# Patient Record
Sex: MALE | Race: BLACK OR AFRICAN AMERICAN | Employment: UNEMPLOYED | URBAN - METROPOLITAN AREA
[De-identification: names, ages, dates, MRNs, and addresses within clinical notes are randomized per-mention and may not be internally consistent; named-entity substitution may affect disease eponyms.]

---

## 2018-10-13 ENCOUNTER — HOSPITAL ENCOUNTER (EMERGENCY)
Age: 53
Discharge: HOME | End: 2018-10-13
Attending: EMERGENCY MEDICINE
Payer: COMMERCIAL

## 2018-10-13 ENCOUNTER — APPOINTMENT (OUTPATIENT)
Dept: GENERAL RADIOLOGY | Age: 53
End: 2018-10-13
Payer: COMMERCIAL

## 2018-10-13 VITALS
TEMPERATURE: 96.6 F | DIASTOLIC BLOOD PRESSURE: 72 MMHG | RESPIRATION RATE: 16 BRPM | BODY MASS INDEX: 26.48 KG/M2 | WEIGHT: 185 LBS | OXYGEN SATURATION: 96 % | HEART RATE: 81 BPM | SYSTOLIC BLOOD PRESSURE: 113 MMHG | HEIGHT: 70 IN

## 2018-10-13 DIAGNOSIS — M25.561 RIGHT KNEE PAIN, UNSPECIFIED CHRONICITY: Primary | ICD-10-CM

## 2018-10-13 DIAGNOSIS — K08.89 PAIN, DENTAL: ICD-10-CM

## 2018-10-13 PROCEDURE — 73562 X-RAY EXAM OF KNEE 3: CPT

## 2018-10-13 PROCEDURE — 99283 EMERGENCY DEPT VISIT LOW MDM: CPT

## 2018-10-13 PROCEDURE — 6370000000 HC RX 637 (ALT 250 FOR IP): Performed by: STUDENT IN AN ORGANIZED HEALTH CARE EDUCATION/TRAINING PROGRAM

## 2018-10-13 RX ORDER — PHENYTOIN 50 MG/1
100 TABLET, CHEWABLE ORAL 3 TIMES DAILY
Status: ON HOLD | COMMUNITY
End: 2021-11-25 | Stop reason: HOSPADM

## 2018-10-13 RX ORDER — ACETAMINOPHEN 500 MG
1000 TABLET ORAL ONCE
Status: COMPLETED | OUTPATIENT
Start: 2018-10-13 | End: 2018-10-13

## 2018-10-13 RX ADMIN — ACETAMINOPHEN 1000 MG: 500 TABLET ORAL at 12:48

## 2018-10-13 ASSESSMENT — ENCOUNTER SYMPTOMS
VOMITING: 0
SHORTNESS OF BREATH: 0
ABDOMINAL PAIN: 0
TROUBLE SWALLOWING: 0
COUGH: 0
BACK PAIN: 0
NAUSEA: 0
DIARRHEA: 0
EYE PAIN: 0
RHINORRHEA: 0

## 2018-10-13 ASSESSMENT — PAIN DESCRIPTION - LOCATION: LOCATION: BACK;LEG;MOUTH

## 2018-10-13 ASSESSMENT — PAIN SCALES - GENERAL
PAINLEVEL_OUTOF10: 10
PAINLEVEL_OUTOF10: 10
PAINLEVEL_OUTOF10: 6

## 2018-10-13 ASSESSMENT — PAIN DESCRIPTION - PROGRESSION: CLINICAL_PROGRESSION: NOT CHANGED

## 2018-10-13 ASSESSMENT — PAIN DESCRIPTION - PAIN TYPE: TYPE: ACUTE PAIN

## 2018-10-16 DIAGNOSIS — M25.561 RIGHT KNEE PAIN, UNSPECIFIED CHRONICITY: Primary | ICD-10-CM

## 2021-11-15 ENCOUNTER — APPOINTMENT (OUTPATIENT)
Dept: CT IMAGING | Age: 56
DRG: 817 | End: 2021-11-15
Payer: MEDICARE

## 2021-11-15 ENCOUNTER — HOSPITAL ENCOUNTER (INPATIENT)
Age: 56
LOS: 3 days | Discharge: PSYCHIATRIC OR SUBSTANCE USE FACILITY | DRG: 817 | End: 2021-11-18
Attending: EMERGENCY MEDICINE | Admitting: INTERNAL MEDICINE
Payer: MEDICARE

## 2021-11-15 ENCOUNTER — APPOINTMENT (OUTPATIENT)
Dept: GENERAL RADIOLOGY | Age: 56
DRG: 817 | End: 2021-11-15
Payer: MEDICARE

## 2021-11-15 DIAGNOSIS — R77.8 ELEVATED TROPONIN: ICD-10-CM

## 2021-11-15 DIAGNOSIS — T50.904A DRUG OVERDOSE, UNDETERMINED INTENT, INITIAL ENCOUNTER: Primary | ICD-10-CM

## 2021-11-15 PROBLEM — R73.9 HYPERGLYCEMIA: Status: ACTIVE | Noted: 2021-11-15

## 2021-11-15 PROBLEM — R79.89 ELEVATED LFTS: Status: ACTIVE | Noted: 2021-11-15

## 2021-11-15 PROBLEM — T50.901S DRUG OVERDOSE, ACCIDENTAL OR UNINTENTIONAL, SEQUELA: Status: ACTIVE | Noted: 2021-11-15

## 2021-11-15 PROBLEM — D72.829 LEUKOCYTOSIS: Status: ACTIVE | Noted: 2021-11-15

## 2021-11-15 PROBLEM — R79.89 TROPONIN LEVEL ELEVATED: Status: ACTIVE | Noted: 2021-11-15

## 2021-11-15 PROBLEM — N17.9 AKI (ACUTE KIDNEY INJURY) (HCC): Status: ACTIVE | Noted: 2021-11-15

## 2021-11-15 PROBLEM — Z87.898 HISTORY OF SEIZURES: Status: ACTIVE | Noted: 2021-11-15

## 2021-11-15 LAB
-: NORMAL
ABSOLUTE EOS #: 0.05 K/UL (ref 0–0.44)
ABSOLUTE IMMATURE GRANULOCYTE: 0.07 K/UL (ref 0–0.3)
ABSOLUTE LYMPH #: 3.76 K/UL (ref 1.1–3.7)
ABSOLUTE MONO #: 0.62 K/UL (ref 0.1–1.2)
ACETAMINOPHEN LEVEL: <5 UG/ML (ref 10–30)
ACETAMINOPHEN LEVEL: <5 UG/ML (ref 10–30)
ALBUMIN SERPL-MCNC: 3.5 G/DL (ref 3.5–5.2)
ALBUMIN SERPL-MCNC: 4.9 G/DL (ref 3.5–5.2)
ALBUMIN/GLOBULIN RATIO: 1.3 (ref 1–2.5)
ALBUMIN/GLOBULIN RATIO: 1.4 (ref 1–2.5)
ALLEN TEST: POSITIVE
ALP BLD-CCNC: 110 U/L (ref 40–129)
ALP BLD-CCNC: 72 U/L (ref 40–129)
ALT SERPL-CCNC: 147 U/L (ref 5–41)
ALT SERPL-CCNC: 88 U/L (ref 5–41)
AMORPHOUS: NORMAL
AMPHETAMINE SCREEN URINE: NEGATIVE
ANION GAP SERPL CALCULATED.3IONS-SCNC: 12 MMOL/L (ref 9–17)
ANION GAP SERPL CALCULATED.3IONS-SCNC: 26 MMOL/L (ref 9–17)
AST SERPL-CCNC: 107 U/L
AST SERPL-CCNC: 211 U/L
BACTERIA: NORMAL
BARBITURATE SCREEN URINE: NEGATIVE
BASOPHILS # BLD: 0 % (ref 0–2)
BASOPHILS ABSOLUTE: 0.04 K/UL (ref 0–0.2)
BENZODIAZEPINE SCREEN, URINE: NEGATIVE
BILIRUB SERPL-MCNC: 0.39 MG/DL (ref 0.3–1.2)
BILIRUB SERPL-MCNC: 0.5 MG/DL (ref 0.3–1.2)
BILIRUBIN URINE: NEGATIVE
BNP INTERPRETATION: NORMAL
BUN BLDV-MCNC: 16 MG/DL (ref 6–20)
BUN BLDV-MCNC: 17 MG/DL (ref 6–20)
BUN/CREAT BLD: ABNORMAL (ref 9–20)
BUN/CREAT BLD: ABNORMAL (ref 9–20)
BUPRENORPHINE URINE: ABNORMAL
CALCIUM SERPL-MCNC: 8.2 MG/DL (ref 8.6–10.4)
CALCIUM SERPL-MCNC: 9.7 MG/DL (ref 8.6–10.4)
CANNABINOID SCREEN URINE: NEGATIVE
CASTS UA: NORMAL /LPF (ref 0–8)
CHLORIDE BLD-SCNC: 102 MMOL/L (ref 98–107)
CHLORIDE BLD-SCNC: 97 MMOL/L (ref 98–107)
CO2: 16 MMOL/L (ref 20–31)
CO2: 22 MMOL/L (ref 20–31)
COCAINE METABOLITE, URINE: POSITIVE
COLOR: YELLOW
COMMENT UA: ABNORMAL
CREAT SERPL-MCNC: 0.76 MG/DL (ref 0.7–1.2)
CREAT SERPL-MCNC: 1.31 MG/DL (ref 0.7–1.2)
CRYSTALS, UA: NORMAL /HPF
DIFFERENTIAL TYPE: ABNORMAL
EOSINOPHILS RELATIVE PERCENT: 0 % (ref 1–4)
EPITHELIAL CELLS UA: NORMAL /HPF (ref 0–5)
ESTIMATED AVERAGE GLUCOSE: 108 MG/DL
ETHANOL PERCENT: <0.01 %
ETHANOL: <10 MG/DL
FIO2: ABNORMAL
GFR AFRICAN AMERICAN: >60 ML/MIN
GFR AFRICAN AMERICAN: >60 ML/MIN
GFR NON-AFRICAN AMERICAN: 57 ML/MIN
GFR NON-AFRICAN AMERICAN: >60 ML/MIN
GFR SERPL CREATININE-BSD FRML MDRD: ABNORMAL ML/MIN/{1.73_M2}
GLUCOSE BLD-MCNC: 248 MG/DL (ref 70–99)
GLUCOSE BLD-MCNC: 71 MG/DL (ref 75–110)
GLUCOSE BLD-MCNC: 73 MG/DL (ref 70–99)
GLUCOSE BLD-MCNC: 84 MG/DL (ref 74–100)
GLUCOSE URINE: NEGATIVE
HAV IGM SER IA-ACNC: NONREACTIVE
HBA1C MFR BLD: 5.4 % (ref 4–6)
HCT VFR BLD CALC: 37 % (ref 40.7–50.3)
HCT VFR BLD CALC: 49.5 % (ref 40.7–50.3)
HEMOGLOBIN: 11.5 G/DL (ref 13–17)
HEMOGLOBIN: 14.4 G/DL (ref 13–17)
HEPATITIS B CORE IGM ANTIBODY: NONREACTIVE
HEPATITIS B SURFACE ANTIGEN: NONREACTIVE
HEPATITIS C ANTIBODY: NONREACTIVE
IMMATURE GRANULOCYTES: 1 %
KETONES, URINE: ABNORMAL
LEUKOCYTE ESTERASE, URINE: ABNORMAL
LYMPHOCYTES # BLD: 28 % (ref 24–43)
MAGNESIUM: 2 MG/DL (ref 1.6–2.6)
MCH RBC QN AUTO: 24.7 PG (ref 25.2–33.5)
MCH RBC QN AUTO: 25.2 PG (ref 25.2–33.5)
MCHC RBC AUTO-ENTMCNC: 29.1 G/DL (ref 28.4–34.8)
MCHC RBC AUTO-ENTMCNC: 31.1 G/DL (ref 28.4–34.8)
MCV RBC AUTO: 81 FL (ref 82.6–102.9)
MCV RBC AUTO: 84.8 FL (ref 82.6–102.9)
MDMA URINE: ABNORMAL
METHADONE SCREEN, URINE: NEGATIVE
METHAMPHETAMINE, URINE: ABNORMAL
MODE: ABNORMAL
MONOCYTES # BLD: 5 % (ref 3–12)
MUCUS: NORMAL
MYOGLOBIN: 105 NG/ML (ref 28–72)
NEGATIVE BASE EXCESS, ART: 1 (ref 0–2)
NITRITE, URINE: NEGATIVE
NRBC AUTOMATED: 0 PER 100 WBC
NRBC AUTOMATED: 0 PER 100 WBC
O2 DEVICE/FLOW/%: ABNORMAL
OPIATES, URINE: NEGATIVE
OTHER OBSERVATIONS UA: NORMAL
OXYCODONE SCREEN URINE: NEGATIVE
PARTIAL THROMBOPLASTIN TIME: 22.2 SEC (ref 20.5–30.5)
PARTIAL THROMBOPLASTIN TIME: 33.3 SEC (ref 20.5–30.5)
PARTIAL THROMBOPLASTIN TIME: 38.2 SEC (ref 20.5–30.5)
PATIENT TEMP: ABNORMAL
PDW BLD-RTO: 14.3 % (ref 11.8–14.4)
PDW BLD-RTO: 14.4 % (ref 11.8–14.4)
PH UA: 5 (ref 5–8)
PHENCYCLIDINE, URINE: NEGATIVE
PHENYTOIN DATE LAST DOSE: ABNORMAL
PHENYTOIN DOSE AMOUNT: ABNORMAL
PHENYTOIN DOSE TIME: ABNORMAL
PHENYTOIN LEVEL: <0.8 UG/ML (ref 10–20)
PLATELET # BLD: 217 K/UL (ref 138–453)
PLATELET # BLD: 348 K/UL (ref 138–453)
PLATELET ESTIMATE: ABNORMAL
PMV BLD AUTO: 9.4 FL (ref 8.1–13.5)
PMV BLD AUTO: 9.5 FL (ref 8.1–13.5)
POC HCO3: 26.5 MMOL/L (ref 21–28)
POC O2 SATURATION: 98 % (ref 94–98)
POC PCO2 TEMP: ABNORMAL MM HG
POC PCO2: 56 MM HG (ref 35–48)
POC PH TEMP: ABNORMAL
POC PH: 7.28 (ref 7.35–7.45)
POC PO2 TEMP: ABNORMAL MM HG
POC PO2: 118.1 MM HG (ref 83–108)
POSITIVE BASE EXCESS, ART: ABNORMAL (ref 0–3)
POTASSIUM SERPL-SCNC: 4.3 MMOL/L (ref 3.7–5.3)
POTASSIUM SERPL-SCNC: 5.1 MMOL/L (ref 3.7–5.3)
PRO-BNP: 69 PG/ML
PROCALCITONIN: 0.06 NG/ML
PROPOXYPHENE, URINE: ABNORMAL
PROTEIN UA: ABNORMAL
RBC # BLD: 4.57 M/UL (ref 4.21–5.77)
RBC # BLD: 5.84 M/UL (ref 4.21–5.77)
RBC # BLD: ABNORMAL 10*6/UL
RBC UA: NORMAL /HPF (ref 0–4)
RENAL EPITHELIAL, UA: NORMAL /HPF
SALICYLATE LEVEL: <1 MG/DL (ref 3–10)
SAMPLE SITE: ABNORMAL
SARS-COV-2, RAPID: NOT DETECTED
SEG NEUTROPHILS: 66 % (ref 36–65)
SEGMENTED NEUTROPHILS ABSOLUTE COUNT: 8.86 K/UL (ref 1.5–8.1)
SODIUM BLD-SCNC: 136 MMOL/L (ref 135–144)
SODIUM BLD-SCNC: 139 MMOL/L (ref 135–144)
SPECIFIC GRAVITY UA: 1.02 (ref 1–1.03)
SPECIMEN DESCRIPTION: NORMAL
TCO2 (CALC), ART: ABNORMAL MMOL/L (ref 22–29)
TEST INFORMATION: ABNORMAL
TOTAL CK: 258 U/L (ref 39–308)
TOTAL PROTEIN: 6.2 G/DL (ref 6.4–8.3)
TOTAL PROTEIN: 8.5 G/DL (ref 6.4–8.3)
TOXIC TRICYCLIC SC,BLOOD: NEGATIVE
TRICHOMONAS: NORMAL
TRICYCLIC ANTIDEPRESSANTS, UR: ABNORMAL
TROPONIN INTERP: ABNORMAL
TROPONIN T: ABNORMAL NG/ML
TROPONIN, HIGH SENSITIVITY: 52 NG/L (ref 0–22)
TROPONIN, HIGH SENSITIVITY: 75 NG/L (ref 0–22)
TROPONIN, HIGH SENSITIVITY: 85 NG/L (ref 0–22)
TURBIDITY: CLEAR
URINE HGB: NEGATIVE
UROBILINOGEN, URINE: NORMAL
WBC # BLD: 13.4 K/UL (ref 3.5–11.3)
WBC # BLD: 6.7 K/UL (ref 3.5–11.3)
WBC # BLD: ABNORMAL 10*3/UL
WBC UA: NORMAL /HPF (ref 0–5)
YEAST: NORMAL

## 2021-11-15 PROCEDURE — 82803 BLOOD GASES ANY COMBINATION: CPT

## 2021-11-15 PROCEDURE — 93005 ELECTROCARDIOGRAM TRACING: CPT | Performed by: STUDENT IN AN ORGANIZED HEALTH CARE EDUCATION/TRAINING PROGRAM

## 2021-11-15 PROCEDURE — 85730 THROMBOPLASTIN TIME PARTIAL: CPT

## 2021-11-15 PROCEDURE — 6360000002 HC RX W HCPCS

## 2021-11-15 PROCEDURE — 84145 PROCALCITONIN (PCT): CPT

## 2021-11-15 PROCEDURE — 99223 1ST HOSP IP/OBS HIGH 75: CPT | Performed by: INTERNAL MEDICINE

## 2021-11-15 PROCEDURE — 80074 ACUTE HEPATITIS PANEL: CPT

## 2021-11-15 PROCEDURE — 82947 ASSAY GLUCOSE BLOOD QUANT: CPT

## 2021-11-15 PROCEDURE — 80179 DRUG ASSAY SALICYLATE: CPT

## 2021-11-15 PROCEDURE — 96366 THER/PROPH/DIAG IV INF ADDON: CPT

## 2021-11-15 PROCEDURE — 81001 URINALYSIS AUTO W/SCOPE: CPT

## 2021-11-15 PROCEDURE — 6360000002 HC RX W HCPCS: Performed by: INTERNAL MEDICINE

## 2021-11-15 PROCEDURE — 71045 X-RAY EXAM CHEST 1 VIEW: CPT

## 2021-11-15 PROCEDURE — 85025 COMPLETE CBC W/AUTO DIFF WBC: CPT

## 2021-11-15 PROCEDURE — 85027 COMPLETE CBC AUTOMATED: CPT

## 2021-11-15 PROCEDURE — 83036 HEMOGLOBIN GLYCOSYLATED A1C: CPT

## 2021-11-15 PROCEDURE — 99285 EMERGENCY DEPT VISIT HI MDM: CPT

## 2021-11-15 PROCEDURE — 83880 ASSAY OF NATRIURETIC PEPTIDE: CPT

## 2021-11-15 PROCEDURE — 80185 ASSAY OF PHENYTOIN TOTAL: CPT

## 2021-11-15 PROCEDURE — 80307 DRUG TEST PRSMV CHEM ANLYZR: CPT

## 2021-11-15 PROCEDURE — 80053 COMPREHEN METABOLIC PANEL: CPT

## 2021-11-15 PROCEDURE — 36600 WITHDRAWAL OF ARTERIAL BLOOD: CPT

## 2021-11-15 PROCEDURE — 6370000000 HC RX 637 (ALT 250 FOR IP): Performed by: INTERNAL MEDICINE

## 2021-11-15 PROCEDURE — 2580000003 HC RX 258: Performed by: STUDENT IN AN ORGANIZED HEALTH CARE EDUCATION/TRAINING PROGRAM

## 2021-11-15 PROCEDURE — 70450 CT HEAD/BRAIN W/O DYE: CPT

## 2021-11-15 PROCEDURE — 87635 SARS-COV-2 COVID-19 AMP PRB: CPT

## 2021-11-15 PROCEDURE — 2060000000 HC ICU INTERMEDIATE R&B

## 2021-11-15 PROCEDURE — G0480 DRUG TEST DEF 1-7 CLASSES: HCPCS

## 2021-11-15 PROCEDURE — 80143 DRUG ASSAY ACETAMINOPHEN: CPT

## 2021-11-15 PROCEDURE — 6360000002 HC RX W HCPCS: Performed by: STUDENT IN AN ORGANIZED HEALTH CARE EDUCATION/TRAINING PROGRAM

## 2021-11-15 PROCEDURE — 82550 ASSAY OF CK (CPK): CPT

## 2021-11-15 PROCEDURE — 96365 THER/PROPH/DIAG IV INF INIT: CPT

## 2021-11-15 PROCEDURE — 84484 ASSAY OF TROPONIN QUANT: CPT

## 2021-11-15 PROCEDURE — 83735 ASSAY OF MAGNESIUM: CPT

## 2021-11-15 PROCEDURE — 83874 ASSAY OF MYOGLOBIN: CPT

## 2021-11-15 PROCEDURE — 87086 URINE CULTURE/COLONY COUNT: CPT

## 2021-11-15 RX ORDER — NALOXONE HYDROCHLORIDE 1 MG/ML
INJECTION INTRAMUSCULAR; INTRAVENOUS; SUBCUTANEOUS
Status: COMPLETED
Start: 2021-11-15 | End: 2021-11-15

## 2021-11-15 RX ORDER — NALOXONE HYDROCHLORIDE 0.4 MG/ML
INJECTION, SOLUTION INTRAMUSCULAR; INTRAVENOUS; SUBCUTANEOUS
Status: COMPLETED
Start: 2021-11-15 | End: 2021-11-15

## 2021-11-15 RX ORDER — HEPARIN SODIUM 1000 [USP'U]/ML
2000 INJECTION, SOLUTION INTRAVENOUS; SUBCUTANEOUS PRN
Status: DISCONTINUED | OUTPATIENT
Start: 2021-11-15 | End: 2021-11-17

## 2021-11-15 RX ORDER — SODIUM CHLORIDE 0.9 % (FLUSH) 0.9 %
5-40 SYRINGE (ML) INJECTION EVERY 12 HOURS SCHEDULED
Status: DISCONTINUED | OUTPATIENT
Start: 2021-11-15 | End: 2021-11-18 | Stop reason: HOSPADM

## 2021-11-15 RX ORDER — 0.9 % SODIUM CHLORIDE 0.9 %
500 INTRAVENOUS SOLUTION INTRAVENOUS ONCE
Status: COMPLETED | OUTPATIENT
Start: 2021-11-15 | End: 2021-11-15

## 2021-11-15 RX ORDER — SODIUM CHLORIDE 0.9 % (FLUSH) 0.9 %
5-40 SYRINGE (ML) INJECTION PRN
Status: DISCONTINUED | OUTPATIENT
Start: 2021-11-15 | End: 2021-11-18 | Stop reason: HOSPADM

## 2021-11-15 RX ORDER — 0.9 % SODIUM CHLORIDE 0.9 %
1000 INTRAVENOUS SOLUTION INTRAVENOUS ONCE
Status: COMPLETED | OUTPATIENT
Start: 2021-11-15 | End: 2021-11-15

## 2021-11-15 RX ORDER — FAMOTIDINE 20 MG/1
20 TABLET, FILM COATED ORAL 2 TIMES DAILY
Status: DISCONTINUED | OUTPATIENT
Start: 2021-11-15 | End: 2021-11-18 | Stop reason: HOSPADM

## 2021-11-15 RX ORDER — ONDANSETRON 4 MG/1
4 TABLET, ORALLY DISINTEGRATING ORAL EVERY 8 HOURS PRN
Status: DISCONTINUED | OUTPATIENT
Start: 2021-11-15 | End: 2021-11-18 | Stop reason: HOSPADM

## 2021-11-15 RX ORDER — HEPARIN SODIUM 1000 [USP'U]/ML
4000 INJECTION, SOLUTION INTRAVENOUS; SUBCUTANEOUS ONCE
Status: COMPLETED | OUTPATIENT
Start: 2021-11-15 | End: 2021-11-15

## 2021-11-15 RX ORDER — HEPARIN SODIUM 10000 [USP'U]/100ML
5-30 INJECTION, SOLUTION INTRAVENOUS CONTINUOUS
Status: DISCONTINUED | OUTPATIENT
Start: 2021-11-15 | End: 2021-11-16

## 2021-11-15 RX ORDER — SODIUM CHLORIDE 9 MG/ML
25 INJECTION, SOLUTION INTRAVENOUS PRN
Status: DISCONTINUED | OUTPATIENT
Start: 2021-11-15 | End: 2021-11-18 | Stop reason: HOSPADM

## 2021-11-15 RX ORDER — LORAZEPAM 2 MG/ML
INJECTION INTRAMUSCULAR
Status: COMPLETED
Start: 2021-11-15 | End: 2021-11-15

## 2021-11-15 RX ORDER — DIPHENHYDRAMINE HYDROCHLORIDE 50 MG/ML
INJECTION INTRAMUSCULAR; INTRAVENOUS
Status: COMPLETED
Start: 2021-11-15 | End: 2021-11-15

## 2021-11-15 RX ORDER — PHENYTOIN 50 MG/1
100 TABLET, CHEWABLE ORAL 3 TIMES DAILY
Status: DISCONTINUED | OUTPATIENT
Start: 2021-11-15 | End: 2021-11-18 | Stop reason: HOSPADM

## 2021-11-15 RX ORDER — ONDANSETRON 2 MG/ML
4 INJECTION INTRAMUSCULAR; INTRAVENOUS EVERY 6 HOURS PRN
Status: DISCONTINUED | OUTPATIENT
Start: 2021-11-15 | End: 2021-11-18 | Stop reason: HOSPADM

## 2021-11-15 RX ORDER — POLYETHYLENE GLYCOL 3350 17 G/17G
17 POWDER, FOR SOLUTION ORAL DAILY
Status: DISCONTINUED | OUTPATIENT
Start: 2021-11-15 | End: 2021-11-18 | Stop reason: HOSPADM

## 2021-11-15 RX ORDER — HALOPERIDOL 5 MG/ML
INJECTION INTRAMUSCULAR
Status: COMPLETED
Start: 2021-11-15 | End: 2021-11-15

## 2021-11-15 RX ORDER — HEPARIN SODIUM 1000 [USP'U]/ML
4000 INJECTION, SOLUTION INTRAVENOUS; SUBCUTANEOUS PRN
Status: DISCONTINUED | OUTPATIENT
Start: 2021-11-15 | End: 2021-11-17

## 2021-11-15 RX ADMIN — DIPHENHYDRAMINE HYDROCHLORIDE 50 MG: 50 INJECTION, SOLUTION INTRAMUSCULAR; INTRAVENOUS at 03:31

## 2021-11-15 RX ADMIN — PHENYTOIN SODIUM 1250 MG: 50 INJECTION INTRAMUSCULAR; INTRAVENOUS at 09:15

## 2021-11-15 RX ADMIN — NALOXONE HYDROCHLORIDE 0.4 MG: 0.4 INJECTION, SOLUTION INTRAMUSCULAR; INTRAVENOUS; SUBCUTANEOUS at 05:02

## 2021-11-15 RX ADMIN — HEPARIN SODIUM 4000 UNITS: 1000 INJECTION INTRAVENOUS; SUBCUTANEOUS at 09:45

## 2021-11-15 RX ADMIN — FAMOTIDINE 20 MG: 20 TABLET, FILM COATED ORAL at 23:31

## 2021-11-15 RX ADMIN — HALOPERIDOL LACTATE 5 MG: 5 INJECTION, SOLUTION INTRAMUSCULAR at 03:31

## 2021-11-15 RX ADMIN — HEPARIN SODIUM AND DEXTROSE 15.92 UNITS/KG/HR: 10000; 5 INJECTION INTRAVENOUS at 21:09

## 2021-11-15 RX ADMIN — SODIUM CHLORIDE 1000 ML: 9 INJECTION, SOLUTION INTRAVENOUS at 03:36

## 2021-11-15 RX ADMIN — HEPARIN SODIUM AND DEXTROSE 11.92 UNITS/KG/HR: 10000; 5 INJECTION INTRAVENOUS at 09:48

## 2021-11-15 RX ADMIN — SODIUM CHLORIDE 1000 ML: 9 INJECTION, SOLUTION INTRAVENOUS at 05:18

## 2021-11-15 RX ADMIN — NALOXONE HYDROCHLORIDE 0.4 MG/HR: 1 INJECTION PARENTERAL at 05:53

## 2021-11-15 RX ADMIN — HEPARIN SODIUM 2000 UNITS: 1000 INJECTION INTRAVENOUS; SUBCUTANEOUS at 21:10

## 2021-11-15 RX ADMIN — NALOXONE HYDROCHLORIDE 2 MG: 1 INJECTION PARENTERAL at 05:10

## 2021-11-15 RX ADMIN — HEPARIN SODIUM AND DEXTROSE 13.92 UNITS/KG/HR: 10000; 5 INJECTION INTRAVENOUS at 15:13

## 2021-11-15 RX ADMIN — LORAZEPAM 2 MG: 2 INJECTION INTRAMUSCULAR; INTRAVENOUS at 03:31

## 2021-11-15 RX ADMIN — HEPARIN SODIUM 2000 UNITS: 1000 INJECTION INTRAVENOUS; SUBCUTANEOUS at 15:12

## 2021-11-15 NOTE — CARE COORDINATION
Case Management Initial Discharge Plan  Karine Mckoy,             Met with:patient to discuss discharge plans. Information verified: address, contacts, phone number, , insurance Yes  Insurance Provider: [employer]    Emergency Contact/Next of Kin name & number: per face sheet Consuelo Garcia parent   Who are involved in patient's support system? Family     PCP: No primary care provider on file. Date of last visit: none      Discharge Planning    Living Arrangements:        Home has 2 stories  2 stairs to climb to get into front door, flightstairs to climb to reach second floor  Location of bedroom/bathroom in home up    Patient able to perform ADL's:Independent    Current Services (outpatient & in home)   DME equipment:   DME provider:     Is patient receiving oral anticoagulation therapy? No    If indicated:   Physician managing anticoagulation treatment:   Where does patient obtain lab work for ATC treatment? Potential Assistance Needed:       Patient agreeable to home care: No  Largo of choice provided:  no    Prior SNF/Rehab Placement and Facility:   Agreeable to SNF/Rehab: No  Largo of choice provided: no     Evaluation: no    Expected Discharge date:       Patient expects to be discharged to: If home: is the family and/or caregiver wiling & able to provide support at home? Who will be providing this support? Follow Up Appointment: Best Day/ Time:      Transportation provider: not sure  Transportation arrangements needed for discharge: No    Readmission Risk              Risk of Unplanned Readmission:  9             Does patient have a readmission risk score greater than 14?: No  If yes, follow-up appointment must be made within 7 days of discharge.      Goals of Care:       Educated pt on transitional options, provided freedom of choice and are agreeable with plan      Discharge Plan: follow for needs not sure of plan needs pcp and probably ride - need to

## 2021-11-15 NOTE — H&P
[facility]  Office: [address], DO, Linda Ramirezroe, DO, Ediliamarcellus Dry, DO, Kory Viktoriya Blood, DO, Yoli Manning MD, Roz Yates MD, Ines Rodriguez MD, Ruth Armstrong MD, Griselda Morales MD, Addi Valdez MD, Rosemarie Villegas MD, Dania Herrera, DO, Heather Colon, DO, William Pham MD,  Princess Hedrick, DO, Yuliet Lopez MD, Valeria Worthington MD, Nini Vyas MD, Joseph Lopez MD, Tonio Alcantar MD, Edwin Tolbert MD, Christie Hicks MD, Altagracia Castro [facility], [facility], CNP, [place], CNP, Dinorah Henriquez, CNS, Gurpreet Kapadia, CNP, Sotero Jones, CNP, Seven Mckenzie, CNP, Dipti Dimas, CNP, Alina Vásquez, CNP, Jason Walsh PA-C, Milagros Alcala, [facility], Darius Baptiste, CNP, Phylmicaela Ramon, CNP, Dary Huang, CNP, Cassie Nugent, CNP, Luisa Lipoma, CNP, Stacey Graham, CNP, Veronica Pitts, CNP         [address]    HISTORY AND PHYSICAL EXAMINATION            Date:   11/15/2021  Patient name:  Leila De La Paz  Date of admission:  11/15/2021  3:19 AM  MRN:   [id number]  Account:  [de-identified]  YOB: 1965  PCP:    No primary care provider on file. Room:   01/01  Code Status:    No Order    Chief Complaint:     Chief Complaint   Patient presents with    Drug Overdose     found down, 4mg Narcan given PTA       History Obtained From:     patient, electronic medical record, ER resident    History of Present Illness:     Admitted through ER with following information:  Leila De La Paz is a 50 y.o. male who presents with suspected drug overdose. Patient was found down, unresponsive by EMS and given 4 mg IN narcan en route. Patient awakened immediately and began screaming. Patient extremely combative. Unknown LKW, patient not able to verbalize if he has any pain or if he did any street drugs. Patient screaming and physically combative throughout his initial assessment.  No overt signs of trauma on arrival. Per EMR, has a history of seizures and is supposed to be on dilantin 100 mg TID. Patient was kept on soft restraints which have been removed now  His cardiac enzymes were elevated for which he has been evaluated by cardiology and has been started on heparin drip  His creatinine is slightly elevated1.31, no previous numbers to compare  His LFT are also elevated, no previous numbers to compare  Blood glucose 248, no previous numbers to compare  Currently patient is awake and alert, answering appropriately, states he took some drugs orally, states he has been using IV drugs but is evasive when answering if he took anything this time  Past Medical History:     Past Medical History:   Diagnosis Date    Seizures ([facility] [place] [address]         Past Surgical History:     No past surgical history on file. Medications Prior to Admission:     Prior to Admission medications    Medication Sig Start Date End Date Taking? Authorizing Provider   phenytoin (DILANTIN) 50 MG tablet Take 100 mg by mouth 3 times daily    Historical Provider, MD        Allergies:     Pcn [penicillins]    Social History:     Tobacco:    reports that he has never smoked. He has never used smokeless tobacco.  Alcohol:      reports no history of alcohol use. Drug Use:  reports no history of drug use. Family History:     No family history on file. Review of Systems:   Not a good historian:  Positive and Negative as described in HPI.     CONSTITUTIONAL:  negative for fevers, chills, sweats, fatigue, weight loss  HEENT:  negative for vision, hearing changes, runny nose, throat pain  RESPIRATORY:  negative for shortness of breath, cough, congestion, wheezing  CARDIOVASCULAR:  negative for chest pain, palpitations  GASTROINTESTINAL:  negative for nausea, vomiting, diarrhea, constipation, change in bowel habits, abdominal pain   GENITOURINARY:  negative for difficulty of urination, burning with urination, frequency   INTEGUMENT:  negative for rash, skin lesions, easy bruising HEMATOLOGIC/LYMPHATIC:  negative for swelling/edema   ALLERGIC/IMMUNOLOGIC:  negative for urticaria , itching  ENDOCRINE:  negative increase in drinking, increase in urination, hot or cold intolerance  MUSCULOSKELETAL:  negative joint pains, muscle aches, swelling of joints  NEUROLOGICAL:  negative for headaches, dizziness, lightheadedness, numbness, pain, tingling extremities  BEHAVIOR/PSYCH:  negative for depression, anxiety    Physical Exam:   /77   Pulse 71   Temp 97.5 °F (36.4 °C) (Oral)   Resp 15   Ht 5' 10\" (1.778 m)   Wt 185 lb (83.9 kg)   SpO2 100%   BMI 26.54 kg/m²   Temp (24hrs), Av.4 °F (34.7 °C), Min:88.5 °F (31.4 °C), Max:97.5 °F (36.4 °C)    No results for input(s): POCGLU in the last 72 hours.   No intake or output data in the 24 hours ending 11/15/21 0907    General Appearance: Slightly drowsy, and in no acute distress cooperative for examination  Mental status: oriented to person, place,   Head: normocephalic, atraumatic  Eye: no icterus, redness, pupils slightly constricted, equal and reactive, extraocular eye movements intact, conjunctiva clear  Ear: normal external ear, no discharge, hearing intact  Nose: no drainage noted  Mouth: mucous membranes moist  Neck: supple, no carotid bruits, thyroid not palpable  Lungs: Bilateral equal air entry, clear to ausculation, no wheezing, rales or rhonchi, normal effort  Cardiovascular: normal rate, regular rhythm, no murmur, gallop, rub  Abdomen: Soft, nontender, nondistended, normal bowel sounds, no hepatomegaly or splenomegaly  Neurologic: There are no new focal motor or sensory deficits, normal muscle tone and bulk, no abnormal sensation, normal speech, cranial nerves II through XII grossly intact  Skin: No gross lesions, rashes, bruising or bleeding on exposed skin area  Extremities: peripheral pulses palpable, no pedal edema or calf pain with palpation, no clear needle marks visible  Psych: normal affect    Investigations: Laboratory Testing:  Recent Results (from the past 24 hour(s))   COVID-19, Rapid    Collection Time: 11/15/21  3:41 AM    Specimen: Nasopharyngeal Swab   Result Value Ref Range    Specimen Description . NASOPHARYNGEAL SWAB     SARS-CoV-2, Rapid Not Detected Not Detected   CBC Auto Differential    Collection Time: 11/15/21  3:58 AM   Result Value Ref Range    WBC 13.4 (H) 3.5 - 11.3 k/uL    RBC 5.84 (H) 4.21 - 5.77 m/uL    Hemoglobin 14.4 13.0 - 17.0 g/dL    Hematocrit 49.5 40.7 - 50.3 %    MCV 84.8 82.6 - 102.9 fL    MCH 24.7 (L) 25.2 - 33.5 pg    MCHC 29.1 28.4 - 34.8 g/dL    RDW 14.4 11.8 - 14.4 %    Platelets 583 076 - 508 k/uL    MPV 9.4 8.1 - 13.5 fL    NRBC Automated 0.0 0.0 per 100 WBC    Differential Type NOT REPORTED     Seg Neutrophils 66 (H) 36 - 65 %    Lymphocytes 28 24 - 43 %    Monocytes 5 3 - 12 %    Eosinophils % 0 (L) 1 - 4 %    Basophils 0 0 - 2 %    Immature Granulocytes 1 (H) 0 %    Segs Absolute 8.86 (H) 1.50 - 8.10 k/uL    Absolute Lymph # 3.76 (H) 1.10 - 3.70 k/uL    Absolute Mono # 0.62 0.10 - 1.20 k/uL    Absolute Eos # 0.05 0.00 - 0.44 k/uL    Basophils Absolute 0.04 0.00 - 0.20 k/uL    Absolute Immature Granulocyte 0.07 0.00 - 0.30 k/uL    WBC Morphology NOT REPORTED     RBC Morphology NOT REPORTED     Platelet Estimate NOT REPORTED    Comprehensive Metabolic Panel w/ Reflex to MG    Collection Time: 11/15/21  3:58 AM   Result Value Ref Range    Glucose 248 (H) 70 - 99 mg/dL    BUN 17 6 - 20 mg/dL    CREATININE 1.31 (H) 0.70 - 1.20 mg/dL    Bun/Cre Ratio NOT REPORTED 9 - 20    Calcium 9.7 8.6 - 10.4 mg/dL    Sodium 139 135 - 144 mmol/L    Potassium 5.1 3.7 - 5.3 mmol/L    Chloride 97 (L) 98 - 107 mmol/L    CO2 16 (L) 20 - 31 mmol/L    Anion Gap 26 (H) 9 - 17 mmol/L    Alkaline Phosphatase 110 40 - 129 U/L     (H) 5 - 41 U/L     (H) <40 U/L    Total Bilirubin 0.50 0.3 - 1.2 mg/dL    Total Protein 8.5 (H) 6.4 - 8.3 g/dL    Albumin 4.9 3.5 - 5.2 g/dL    Albumin/Globulin Ratio 1.4 1.0 - 2.5    GFR Non- 57 (L) >60 mL/min    GFR African American >60 >60 mL/min    GFR Comment          GFR Staging NOT REPORTED    Troponin    Collection Time: 11/15/21  3:58 AM   Result Value Ref Range    Troponin, High Sensitivity 52 (HH) 0 - 22 ng/L    Troponin T NOT REPORTED <0.03 ng/mL    Troponin Interp NOT REPORTED    Brain Natriuretic Peptide    Collection Time: 11/15/21  3:58 AM   Result Value Ref Range    Pro-BNP 69 <300 pg/mL    BNP Interpretation NOT REPORTED    Procalcitonin    Collection Time: 11/15/21  3:58 AM   Result Value Ref Range    Procalcitonin 0.06 <0.09 ng/mL   CK    Collection Time: 11/15/21  3:58 AM   Result Value Ref Range    Total  39 - 308 U/L   MYOGLOBIN, SERUM    Collection Time: 11/15/21  3:58 AM   Result Value Ref Range    Myoglobin 105 (H) 28 - 72 ng/mL   PHENYTOIN LEVEL, TOTAL    Collection Time: 11/15/21  3:58 AM   Result Value Ref Range    Phenytoin Lvl <0.8 (L) 10.0 - 20.0 ug/mL    Phenytoin Dose Amount NOT REPORTED     Phenytoin Date Last Dose NOT REPORTED     Phenytoin Dose Time NOT REPORTED    Hepatitis Panel, Acute    Collection Time: 11/15/21  3:58 AM   Result Value Ref Range    Hepatitis B Surface Ag NONREACTIVE NONREACTIVE    Hepatitis C Ab NONREACTIVE NONREACTIVE    Hep B Core Ab, IgM NONREACTIVE NONREACTIVE    Hep A IgM NONREACTIVE NONREACTIVE   TOX SCR, BLD, ED    Collection Time: 11/15/21  5:23 AM   Result Value Ref Range    Acetaminophen Level <5 (L) 10 - 30 ug/mL    Ethanol <10 <10 mg/dL    Ethanol percent <9.327 <4.502 %    Salicylate Lvl <1 (L) 3 - 10 mg/dL    Toxic Tricyclic Sc,Blood NEGATIVE NEGATIVE   Troponin    Collection Time: 11/15/21  5:23 AM   Result Value Ref Range    Troponin, High Sensitivity 75 (HH) 0 - 22 ng/L    Troponin T NOT REPORTED <0.03 ng/mL    Troponin Interp NOT REPORTED    Troponin    Collection Time: 11/15/21  7:01 AM   Result Value Ref Range    Troponin, High Sensitivity 85 (HH) 0 - 22 ng/L    Troponin T NOT REPORTED <0.03 ng/mL    Troponin Interp NOT REPORTED    APTT    Collection Time: 11/15/21  8:11 AM   Result Value Ref Range    PTT 22.2 20.5 - 30.5 sec       Imaging/Diagnostics:  CT Head WO Contrast    Result Date: 11/15/2021  No acute intracranial abnormality. XR CHEST PORTABLE    Result Date: 11/15/2021  Findings suggestive of mild congestive failure. Otherwise negative exam. Pneumothorax is not excluded on this supine image. Assessment :      Hospital Problems           Last Modified POA    * (Principal) Drug overdose, accidental or unintentional, sequela 11/15/2021 Yes    History of seizurestakes Dilantin 11/15/2021 Yes    Troponin level elevated 11/15/2021 Yes    Elevated LFTs 11/15/2021 Yes    SYLVIA (acute kidney injury) ([address] 11/15/2021 Yes    Hyperglycemia 11/15/2021 Yes    Leukocytosis 11/15/2021 Yes          Plan:     Patient status inpatient in the Progressive Unit/Step down    1. Narcan drip is being discontinued  2. Continue heparin drip and follow cardiology evaluations and plan  3. Continue heparin drip for now, will change to oral when able to take orally  4. Check for ABG, urine drug screen, hepatitis panel and A1c  5. Start oral diet if he is able to take orally now  6. Seizure precautions  7. Monitor labs in morning    Consultations:   IP CONSULT TO CARDIOLOGY  IP CONSULT TO HOSPITALIST  IP CONSULT TO PSYCHIATRY     Patient is admitted as inpatient status because of co-morbidities listed above, severity of signs and symptoms as outlined, requirement for current medical therapies and most importantly because of direct risk to patient if care not provided in a hospital setting. Expected length of stay > 48 hours. Nicolette Rogers MD  11/15/2021  9:07 AM    Copy sent to Dr. Zuleyma Burr primary care provider on file.

## 2021-11-15 NOTE — ED PROVIDER NOTES
[facility]  ED  Emergency Department Encounter  EmergencyMedicine Resident     Pt Name:Carlos Renteria  MRN: [id number]  [place] 1965  Date of evaluation: 11/15/21  PCP:  No primary care provider on file. This patient was evaluated in the Emergency Department for symptoms described in the history of present illness. The patient was evaluated in the context of the global COVID-19 pandemic, which necessitated consideration that the patient might be at risk for infection with the SARS-CoV-2 virus that causes COVID-19. Institutional protocols and algorithms that pertain to the evaluation of patients at risk for COVID-19 are in a state of rapid change based on information released by regulatory bodies including the CDC and federal and state organizations. These policies and algorithms were followed during the patient's care in the ED. CHIEF COMPLAINT       Chief Complaint   Patient presents with    Drug Overdose     found down, 4mg Narcan given PTA       HISTORY OF PRESENT ILLNESS  (Location/Symptom, Timing/Onset, Context/Setting, Quality, Duration, Modifying Factors, Severity.)      Ioana Jauregui is a 50 y.o. male who presents with suspected drug overdose. Patient was found down, unresponsive by EMS and given 4 mg IN narcan en route. Patient awakened immediately and began screaming. Patient extremely combative. Unknown LKW, patient not able to verbalize if he has any pain or if he did any street drugs. Patient screaming and physically combative throughout his initial assessment. No overt signs of trauma on arrival. Per EMR, has a history of seizures and is supposed to be on dilantin 100 mg TID. PAST MEDICAL / SURGICAL / SOCIAL / FAMILY HISTORY      has a past medical history of Seizures ([facility] [address] has no past surgical history on file.     Social History     Socioeconomic History    Marital status: Single     Spouse name: Not on file    Number of children: Not on file    Years of education: Not on file    Highest education level: Not on file   Occupational History    Not on file   Tobacco Use    Smoking status: Never Smoker    Smokeless tobacco: Never Used   Substance and Sexual Activity    Alcohol use: No    Drug use: No    Sexual activity: Not on file   Other Topics Concern    Not on file   Social History Narrative    Not on file     Social Determinants of Health     Financial Resource Strain:     Difficulty of Paying Living Expenses: Not on file   Food Insecurity:     Worried About Running Out of Food in the Last Year: Not on file    Ervin of Food in the Last Year: Not on file   Transportation Needs:     Lack of Transportation (Medical): Not on file    Lack of Transportation (Non-Medical): Not on file   Physical Activity:     Days of Exercise per Week: Not on file    Minutes of Exercise per Session: Not on file   Stress:     Feeling of Stress : Not on file   Social Connections:     Frequency of Communication with Friends and Family: Not on file    Frequency of Social Gatherings with Friends and Family: Not on file    Attends [employer] Services: Not on file    Active Member of [address] [place] [address] or Organizations: Not on file    Attends Club or Organization Meetings: Not on file    Marital Status: Not on file   Intimate Partner Violence:     Fear of Current or Ex-Partner: Not on file    Emotionally Abused: Not on file    Physically Abused: Not on file    Sexually Abused: Not on file   Housing Stability:     Unable to Pay for Housing in the Last Year: Not on file    Number of [place] in the Last Year: Not on file    Unstable Housing in the Last Year: Not on file       No family history on file. Allergies:  Pcn [penicillins]    Home Medications:  Prior to Admission medications    Medication Sig Start Date End Date Taking?  Authorizing Provider   phenytoin (DILANTIN) 50 MG tablet Take 100 mg by mouth 3 times daily    Historical Provider, MD       REVIEW OF SYSTEMS (2-9 systems for level 4, 10 or more for level 5)      Review of Systems   Unable to perform ROS: Mental status change       PHYSICAL EXAM   (up to 7 for level 4, 8 or more for level 5)      INITIAL VITALS:   /86   Pulse 83   Temp 97.5 °F (36.4 °C) (Oral)   Resp 13   Ht 5' 10\" (1.778 m)   Wt 185 lb (83.9 kg)   SpO2 100%   BMI 26.54 kg/m²     Physical Exam  Vitals reviewed: Initial physical exam limited due to patient extreme combativeness and aggression. HENT:      Head: Normocephalic and atraumatic. Eyes:      Comments: Pinpoint pupils   Cardiovascular:      Rate and Rhythm: Normal rate and regular rhythm. Pulmonary:      Effort: Pulmonary effort is normal.      Breath sounds: Normal breath sounds. Abdominal:      Palpations: Abdomen is soft. Tenderness: There is no abdominal tenderness. Musculoskeletal:      Cervical back: Neck supple. Skin:     Capillary Refill: Capillary refill takes less than 2 seconds. Neurological:      Mental Status: He is disoriented.        DIFFERENTIAL  DIAGNOSIS     PLAN (LABS / IMAGING / EKG):  Orders Placed This Encounter   Procedures    COVID-19, Rapid    XR CHEST PORTABLE    CT Head WO Contrast    CBC Auto Differential    Comprehensive Metabolic Panel w/ Reflex to MG    Troponin    Brain Natriuretic Peptide    Procalcitonin    CK    MYOGLOBIN, SERUM    PHENYTOIN LEVEL, TOTAL    TOX SCR, BLD, ED    Urinalysis Reflex to Culture    Troponin    Troponin    APTT    Inpatient consult to Cardiology    Inpatient consult to Hospitalist    EKG 12 Lead    Restraints violent or self-destructive adult (older than 16yo)       MEDICATIONS ORDERED:  Orders Placed This Encounter   Medications    LORazepam (ATIVAN) 2 MG/ML injection     Mariel Bradshaw: cabinet override    diphenhydrAMINE (BENADRYL) 50 MG/ML injection     Mariel Bradshaw: cabinet override    haloperidol lactate (HALDOL) 5 MG/ML injection     Mariel Bradshaw: cabinet override    0.9 % sodium chloride bolus    naloxone (NARCAN) 0.4 MG/ML injection     VLADIMIR BARROS: cabinet override    naloxone (NARCAN) 2 MG/2ML injection     Sosa Monge: cabinet override    0.9 % sodium chloride bolus    naloxone (NARCAN) 2 mg in sodium chloride 0.9 % 500 mL infusion    phenytoin (DILANTIN) 1,250 mg in sodium chloride 0.9 % 100 mL IVPB (loading dose)    heparin (porcine) injection 4,000 Units    heparin (porcine) injection 4,000 Units    heparin (porcine) injection 2,520 Units    heparin 25,000 units in dextrose 5% 250 mL (premix) infusion       DDX: Drug overdose vs ACS vs MI vs CVA vs pneumothorax vs pleural effusion    DIAGNOSTIC RESULTS / EMERGENCY DEPARTMENT COURSE / MDM   LAB RESULTS:  Results for orders placed or performed during the hospital encounter of 11/15/21   COVID-19, Rapid    Specimen: Nasopharyngeal Swab   Result Value Ref Range    Specimen Description . NASOPHARYNGEAL SWAB     SARS-CoV-2, Rapid Not Detected Not Detected   CBC Auto Differential   Result Value Ref Range    WBC 13.4 (H) 3.5 - 11.3 k/uL    RBC 5.84 (H) 4.21 - 5.77 m/uL    Hemoglobin 14.4 13.0 - 17.0 g/dL    Hematocrit 49.5 40.7 - 50.3 %    MCV 84.8 82.6 - 102.9 fL    MCH 24.7 (L) 25.2 - 33.5 pg    MCHC 29.1 28.4 - 34.8 g/dL    RDW 14.4 11.8 - 14.4 %    Platelets 618 865 - 496 k/uL    MPV 9.4 8.1 - 13.5 fL    NRBC Automated 0.0 0.0 per 100 WBC    Differential Type NOT REPORTED     Seg Neutrophils 66 (H) 36 - 65 %    Lymphocytes 28 24 - 43 %    Monocytes 5 3 - 12 %    Eosinophils % 0 (L) 1 - 4 %    Basophils 0 0 - 2 %    Immature Granulocytes 1 (H) 0 %    Segs Absolute 8.86 (H) 1.50 - 8.10 k/uL    Absolute Lymph # 3.76 (H) 1.10 - 3.70 k/uL    Absolute Mono # 0.62 0.10 - 1.20 k/uL    Absolute Eos # 0.05 0.00 - 0.44 k/uL    Basophils Absolute 0.04 0.00 - 0.20 k/uL    Absolute Immature Granulocyte 0.07 0.00 - 0.30 k/uL    WBC Morphology NOT REPORTED     RBC Morphology NOT REPORTED     Platelet Estimate NOT REPORTED Comprehensive Metabolic Panel w/ Reflex to MG   Result Value Ref Range    Glucose 248 (H) 70 - 99 mg/dL    BUN 17 6 - 20 mg/dL    CREATININE 1.31 (H) 0.70 - 1.20 mg/dL    Bun/Cre Ratio NOT REPORTED 9 - 20    Calcium 9.7 8.6 - 10.4 mg/dL    Sodium 139 135 - 144 mmol/L    Potassium 5.1 3.7 - 5.3 mmol/L    Chloride 97 (L) 98 - 107 mmol/L    CO2 16 (L) 20 - 31 mmol/L    Anion Gap 26 (H) 9 - 17 mmol/L    Alkaline Phosphatase 110 40 - 129 U/L     (H) 5 - 41 U/L     (H) <40 U/L    Total Bilirubin 0.50 0.3 - 1.2 mg/dL    Total Protein 8.5 (H) 6.4 - 8.3 g/dL    Albumin 4.9 3.5 - 5.2 g/dL    Albumin/Globulin Ratio 1.4 1.0 - 2.5    GFR Non- 57 (L) >60 mL/min    GFR African American >60 >60 mL/min    GFR Comment          GFR Staging NOT REPORTED    Troponin   Result Value Ref Range    Troponin, High Sensitivity 52 (HH) 0 - 22 ng/L    Troponin T NOT REPORTED <0.03 ng/mL    Troponin Interp NOT REPORTED    Brain Natriuretic Peptide   Result Value Ref Range    Pro-BNP 69 <300 pg/mL    BNP Interpretation NOT REPORTED    Procalcitonin   Result Value Ref Range    Procalcitonin 0.06 <0.09 ng/mL   CK   Result Value Ref Range    Total  39 - 308 U/L   MYOGLOBIN, SERUM   Result Value Ref Range    Myoglobin 105 (H) 28 - 72 ng/mL   PHENYTOIN LEVEL, TOTAL   Result Value Ref Range    Phenytoin Lvl <0.8 (L) 10.0 - 20.0 ug/mL    Phenytoin Dose Amount NOT REPORTED     Phenytoin Date Last Dose NOT REPORTED     Phenytoin Dose Time NOT REPORTED    TOX SCR, BLD, ED   Result Value Ref Range    Acetaminophen Level <5 (L) 10 - 30 ug/mL    Ethanol <10 <10 mg/dL    Ethanol percent <3.867 <1.318 %    Salicylate Lvl <1 (L) 3 - 10 mg/dL    Toxic Tricyclic Sc,Blood NEGATIVE NEGATIVE   Troponin   Result Value Ref Range    Troponin, High Sensitivity 75 (HH) 0 - 22 ng/L    Troponin T NOT REPORTED <0.03 ng/mL    Troponin Interp NOT REPORTED    Troponin   Result Value Ref Range    Troponin, High Sensitivity 85 (HH) 0 - 22 ng/L Troponin T NOT REPORTED <0.03 ng/mL    Troponin Interp NOT REPORTED        IMPRESSION: Leukocytosis. Troponin 53 > 75 > 85. Dilantin level 0. Elevated myoglobin. SYLVIA. RADIOLOGY:  CT Head WO Contrast    Result Date: 11/15/2021  EXAMINATION: CT OF THE HEAD WITHOUT CONTRAST  11/15/2021 4:13 am TECHNIQUE: CT of the head was performed without the administration of intravenous contrast. Dose modulation, iterative reconstruction, and/or weight based adjustment of the mA/kV was utilized to reduce the radiation dose to as low as reasonably achievable. COMPARISON: 11/20/2006. HISTORY: ORDERING SYSTEM PROVIDED HISTORY: overdose, unknown if fall TECHNOLOGIST PROVIDED HISTORY: overdose, unknown if fall Decision Support Exception - unselect if not a suspected or confirmed emergency medical condition->Emergency Medical Condition (MA) Reason for Exam: drug overdose FINDINGS: BRAIN/VENTRICLES: There is no acute intracranial hemorrhage, mass effect or midline shift. No abnormal extra-axial fluid collection. There is no acute infarct. 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 The appearance is unchanged from the 2006 exam.  There is no evidence of hydrocephalus. ORBITS: The visualized portion of the orbits demonstrate no acute abnormality. SINUSES: The visualized paranasal sinuses and mastoid air cells demonstrate no acute abnormality. SOFT TISSUES/SKULL:  No acute abnormality of the visualized skull or soft tissues. No acute intracranial abnormality.      XR CHEST PORTABLE    Result Date: 11/15/2021  EXAMINATION: ONE XRAY VIEW OF THE CHEST 11/15/2021 4:35 am COMPARISON: 07/07/2006 HISTORY: ORDERING SYSTEM PROVIDED HISTORY: overdose, unknown if fall, r/o fracture, r/o pneumo TECHNOLOGIST PROVIDED HISTORY: overdose, unknown if fall, r/o fracture, r/o pneumo Reason for Exam: supine port, Overdose FINDINGS: Mild cardiomegaly increased from previous. Pulmonary vasculature appears mildly congested. The lungs are otherwise clear low lung volumes. Pneumothorax and pleural effusion are not excluded on this supine image. Surrounding structures are unremarkable. Findings suggestive of mild congestive failure. Otherwise negative exam. Pneumothorax is not excluded on this supine image. EKG  EKG: normal sinus rhythm. All EKG's are interpreted by the Emergency Department Physician who either signs or Co-signs this chart in the absence of a cardiologist.    EMERGENCY DEPARTMENT COURSE:  ED Course as of 11/15/21 0746   Mon Nov 15, 2021   0455 Troponin, High Sensitivity(!!): 52 [JG]   0456 WBC(!): 13.4 [JG]   0506 Patient respirations decreased to 6/min, patient was given 0.4 mg narcan IV with subsequent increase in respirations to 12/min [JG]   0530 Patient respirations back down to 6/min, patient started on narcan drip [JG]   0614 Troponin, High Sensitivity(!!): 75 [JG]   [id number] Cardiology consulted for increasing troponins. Cardiology recommended getting a third troponin and if still uptrending, start on heparin drip [JG]   0713 Temperature increased to 97.8, measured orally. Patient able to answer basic questions, is unable to tell us what he took or what happened prior to arrival. Patient to be admitted for overdose on narcan drip, with uptrending troponins 53 > 75, leukocytosis, and restarting dilantin. Patient given loading dose of dilantin [JG]   0744 Troponin, High Sensitivity(!!): 85 [JG]   0745 3rd troponin uptrending at 85. Per cardiology recommendation, patient started on heparin drip. [JG]      ED Course User Index  [JG] Kayla Osei MD     CONSULTS:  IP CONSULT TO CARDIOLOGY  IP CONSULT TO HOSPITALIST    FINAL IMPRESSION      1. Drug overdose, undetermined intent, initial encounter    2.  Elevated troponin          DISPOSITION / PLAN     DISPOSITION  Admitted 11/15/2021 8:15 AM    PATIENT REFERRED TO:  No follow-up provider specified.     DISCHARGE MEDICATIONS:  New Prescriptions    No medications on file       Dee Lowe MD  Emergency Medicine Resident    (Please note that portions of thisnote were completed with a voice recognition program.  Efforts were made to edit the dictations but occasionally words are mis-transcribed.)       Dee Lowe MD  Resident  11/15/21 9429

## 2021-11-15 NOTE — ED PROVIDER NOTES
FACULTY SIGN-OUT  ADDENDUM       Patient: Michael Fernandez   MRN: [id number]  PCP:  No primary care provider on file. Attestation  I was available and discussed any additional care issues that arose and coordinated the management plans with the resident(s) caring for the patient during my duty period. Any areas of disagreement with resident's documentation of care or procedures are noted on the chart. I was personally present for the key portions of any/all procedures during my duty period. I have documented in the chart those procedures where I was not present during the key portions. The patient's initial evaluation and plan have been discussed with the prior provider who initially evaluated the patient. Pertinent Comments:   The patient is a 50 y.o. male taken in signout with narcotic/opiate overdose requiring Narcan then becoming belligerent requiring B-52 immediately upon arrival.    Patient then having some respiratory issues requiring more Narcan eventual Narcan drip doing much better now  We are awaiting admission    ED COURSE      The patient was given the following medications:  Orders Placed This Encounter   Medications    LORazepam (ATIVAN) 2 MG/ML injection     Mariel Bradshaw: cabinet override    diphenhydrAMINE (BENADRYL) 50 MG/ML injection     Mariel Bradshaw: cabinet override    haloperidol lactate (HALDOL) 5 MG/ML injection     Mariel Bradshaw: cabinet override    0.9 % sodium chloride bolus    naloxone (NARCAN) 0.4 MG/ML injection     VLADIMIR BARROS: cabinet override    naloxone (NARCAN) 2 MG/2ML injection     Sosa Teran: cabinet override    0.9 % sodium chloride bolus    naloxone (NARCAN) 2 mg in sodium chloride 0.9 % 500 mL infusion    phenytoin (DILANTIN) 1,260 mg in sodium chloride 0.9 % 100 mL IVPB (loading dose)       RECENT VITALS:   BP: 115/69  Pulse: 83  Resp: 10  Temp: 97.3 °F (36.3 °C) SpO2: 99 %    (Please note that portions of this note were completed with a voice recognition program.  Efforts were made to edit the dictations but occasionally words are mis-transcribed.)    MD Caity lA  Attending Emergency Medicine Physician       Israel Vargas MD  11/15/21 7479

## 2021-11-15 NOTE — ED NOTES
Dr. Butch Whitehead at the bedside     Yamilex Cavazos, [address] [place] [address]  11/15/21 5364

## 2021-11-15 NOTE — ED NOTES
External urine collection bag placed and connected to wall suction.      Lucía Rae RN  11/15/21 2554

## 2021-11-15 NOTE — Clinical Note
Patient Class: Inpatient [101]   REQUIRED: Diagnosis: Drug overdose, accidental or unintentional, sequela [[id number]]   Estimated Length of Stay: Estimated stay of less than 2 midnights   Telemetry/Cardiac Monitoring Required?: No

## 2021-11-15 NOTE — CONSULTS
Attending Physician Statement:    I have discussed the care of  Fior Gilbert , including pertinent history and exam findings, with the Cardiology fellow/resident. I have seen and examined the patient and the key elements of all parts of the encounter have been performed by me. I agree with the assessment, plan and orders as documented by the fellow/resident, after I modified exam findings and plan of treatments, and the final version is my approved version of the assessment. Additional Comments:   Known history of seizure opiate overdose on Narcan drip  Elevated tropes possible type II  We will get echocardiogram if it is high risk further ischemia work-up be depending on it  Dr. Maria E Choi Cardiology Cardiology    Consult / H&P               Today's Date: 11/15/2021  Patient Name: Fior Gilbert  Date of admission: 11/15/2021  3:19 AM  Patient's age: 50 y. o., 1965  Admission Dx: No admission diagnoses are documented for this encounter. Reason for Consult: elev trop     Requesting Physician: No admitting provider for patient encounter. CHIEF COMPLAINT: AMS    History Obtained From:  patient, EMR    HISTORY OF PRESENT ILLNESS:      The patient is a 50 y.o. male who presents to the hospital with AMS, was found to have opiate overdose requiring Narcan drip. On arrival, pt's BP was high 179/123, hypothermic @88.5F, now WNL. Pertinent labs done Cr 1.31, trop 52>75(no baseline in EMR), proBNP WNL, K 5.1. When I went to evaluate the patient, he was denying any chest pain, no B/L swelling noted, was on 5L NC, on Narcan drip. EKG shows NSR. No cardiac history on file. Past Medical History:   has a past medical history of Seizures ([facility] [address] Past Surgical History:   has no past surgical history on file. Home Medications:    Prior to Admission medications    Medication Sig Start Date End Date Taking?  Authorizing Provider   phenytoin (DILANTIN) 50 MG tablet Take 100 mg by mouth 3 times daily    Historical Provider, MD       Allergies:  Pcn [penicillins]    Social History:   reports that he has never smoked. He has never used smokeless tobacco. He reports that he does not drink alcohol and does not use drugs. Family History: family history is not on file. No h/o sudden cardiac death. REVIEW OF SYSTEMS:    · Constitutional: there has been no unanticipated weight loss. There's been No change in energy level, No change in activity level. · Eyes: No visual changes or diplopia. No scleral icterus. · ENT: No Headaches  · Cardiovascular: as above  · Respiratory: No previous pulmonary problems, No cough  · Gastrointestinal: No abdominal pain. No change in bowel or bladder habits. · Genitourinary: No dysuria, trouble voiding, or hematuria. · Musculoskeletal:  No gait disturbance, No weakness or joint complaints. · Integumentary: No rash or pruritis. · Neurological: No headache, diplopia, change in muscle strength, numbness or tingling. No change in gait, balance, coordination, mood, affect, memory, mentation, behavior. · Psychiatric: No anxiety, or depression. · Endocrine: No temperature intolerance. No excessive thirst, fluid intake, or urination. No tremor. · Hematologic/Lymphatic: No abnormal bruising or bleeding, blood clots or swollen lymph nodes. · Allergic/Immunologic: No nasal congestion or hives. PHYSICAL EXAM:      /69   Pulse 83   Temp 97.3 °F (36.3 °C) (Oral)   Resp 10   Ht 5' 10\" (1.778 m)   Wt 185 lb (83.9 kg)   SpO2 99%   BMI 26.54 kg/m²    Constitutional and General Appearance: alert, cooperative, no distress and appears stated age  HEENT: PERRL, no cervical lymphadenopathy. No masses palpable. Normal oral mucosa  Respiratory:  · Normal excursion and expansion without use of accessory muscles  · Resp Auscultation: Good respiratory effort. No for increased work of breathing.  On auscultation: clear to auscultation bilaterally  Cardiovascular:  · The apical impulse is not displaced  · Heart tones are crisp and normal. regular S1 and S2.  · Jugular venous pulsation Normal  · The carotid upstroke is normal in amplitude and contour without delay or bruit  · Peripheral pulses are symmetrical and full   Abdomen:   · No masses or tenderness  · Bowel sounds present  Extremities:  ·  No Cyanosis or Clubbing  ·  Lower extremity edema: No  ·  Skin: Warm and dry  Neurological:  · Alert and oriented. · Moves all extremities well  · No abnormalities of mood, affect, memory, mentation, or behavior are noted      Labs:     CBC:   Recent Labs     11/15/21  0358   WBC 13.4*   HGB 14.4   HCT 49.5        BMP:   Recent Labs     11/15/21  0358      K 5.1   CO2 16*   BUN 17   CREATININE 1.31*   LABGLOM 57*   GLUCOSE 248*     BNP: No results for input(s): BNP in the last 72 hours. PT/INR: No results for input(s): PROTIME, INR in the last 72 hours. APTT:No results for input(s): APTT in the last 72 hours. CARDIAC ENZYMES:  Recent Labs     11/15/21  0358   CKTOTAL 258     FASTING LIPID PANEL:No results found for: HDL, LDLDIRECT, LDLCALC, TRIG  LIVER PROFILE:  Recent Labs     11/15/21  0358   *   *   LABALBU 4.9         There is no problem list on file for this patient. DATA:    IMPRESSION:    1. Elevated troponins  2. Opioid overdose    RECOMMENDATIONS:  1. Start heparin drip x48 hrs if there are no further contraindications. 2. F/u on EKG   3. Keep K>4, Mg>2  4. Rest of the mgt as per primary team.     Discussed with Dr Teresa Guillory, attending.      Maryellen Odonnell MD  Cardiology Service  Internal Medicine Residency Program, PGY-1  [facility]

## 2021-11-15 NOTE — ED NOTES
Pt resting in bed with eyes closed. NAD noted. Will continue to monitor.       Renato Griffin RN  11/15/21 9703

## 2021-11-15 NOTE — ED NOTES
Pt resting in bed with eyes closed. Pt remains on bairhugger. Lights dimmed. NAD noted.      Raynelle Sacks, RN  11/15/21 4014

## 2021-11-15 NOTE — ED PROVIDER NOTES
[facility]     Emergency Department     Faculty Attestation        I performed a history and physical examination of the patient and discussed management with the resident. I reviewed the residents note and agree with the documented findings and plan of care. Any areas of disagreement are noted on the chart. I was personally present for the key portions of any procedures. I have documented in the chart those procedures where I was not present during the key portions. I have reviewed the emergency nurses triage note. I agree with the chief complaint, past medical history, past surgical history, allergies, medications, social and family history as documented unless otherwise noted below. For mid-level providers such as nurse practitioners as well as physicians assistants:    I have personally seen and evaluated the patient. I find the patient's history and physical exam are consistent with NP/PA documentation. I agree with the care provided, treatment rendered, disposition, & follow-up plan. Additional findings are as noted. Vital Signs: BP (!) 179/123   Pulse 95   Temp (!) 88.5 °F (31.4 °C) (Rectal)   Resp 16   Ht 5' 10\" (1.778 m)   Wt 185 lb (83.9 kg)   BMI 26.54 kg/m²   PCP:  No primary care provider on file. Pertinent Comments:     Patient presents to the emergency department for evaluation of altered mentation. He is reportedly using drugs tonight. He was found on the ground unresponsive unknown last known well. He was combative and thrashing and he required chemical sedation shortly after he came to the emergency department. He is hypothermic here but he has no signs to suggest trauma.   Obtain broad laboratory work-up including CK myoglobin, EKG, chest x-ray CT imaging of brain, reassessment, probable admission      Critical Care  None          Lashon Terrazas MD    Attending Emergency Medicine Physician              Margaret Alexander MD Rudy  11/15/21 6945

## 2021-11-15 NOTE — ED NOTES
Bed: 01  Expected date:   Expected time:   Means of arrival:   Comments:  [address], RN  11/15/21 0532

## 2021-11-15 NOTE — ED PROVIDER NOTES
[facility]  Emergency Department  Emergency Medicine Resident Sign-out     Care of Chuy Pugh was assumed from Dr. Jackie Medellin and is being seen for Drug Overdose (found down, 4mg Narcan given PTA)  . The patient's initial evaluation and plan have been discussed with the prior provider who initially evaluated the patient.      EMERGENCY DEPARTMENT COURSE / MEDICAL DECISION MAKING:       MEDICATIONS GIVEN:  Orders Placed This Encounter   Medications    LORazepam (ATIVAN) 2 MG/ML injection     Mariel Bradshaw: cabinet override    diphenhydrAMINE (BENADRYL) 50 MG/ML injection     Mariel Bradshaw: cabinet override    haloperidol lactate (HALDOL) 5 MG/ML injection     Mariel Bradshaw: cabinet override    0.9 % sodium chloride bolus    naloxone (NARCAN) 0.4 MG/ML injection     VLADIMIR BARROS: cabinet override    naloxone (NARCAN) 2 MG/2ML injection     Sosa Monge: cabinet override    0.9 % sodium chloride bolus    naloxone (NARCAN) 2 mg in sodium chloride 0.9 % 500 mL infusion    phenytoin (DILANTIN) 1,250 mg in sodium chloride 0.9 % 100 mL IVPB (loading dose)    heparin (porcine) injection 4,000 Units    heparin (porcine) injection 4,000 Units    heparin (porcine) injection 2,000 Units    heparin 25,000 units in dextrose 5% 250 mL (premix) infusion       LABS / RADIOLOGY:     Labs Reviewed   CBC WITH AUTO DIFFERENTIAL - Abnormal; Notable for the following components:       Result Value    WBC 13.4 (*)     RBC 5.84 (*)     MCH 24.7 (*)     Seg Neutrophils 66 (*)     Eosinophils % 0 (*)     Immature Granulocytes 1 (*)     Segs Absolute 8.86 (*)     Absolute Lymph # 3.76 (*)     All other components within normal limits   COMPREHENSIVE METABOLIC PANEL W/ REFLEX TO MG FOR LOW K - Abnormal; Notable for the following components:    Glucose 248 (*)     CREATININE 1.31 (*)     Chloride 97 (*)     CO2 16 (*)     Anion Gap 26 (*)      (*)      (*)     Total Protein 8.5 (*)     GFR Non- 57 (*)     All other components within normal limits   TROPONIN - Abnormal; Notable for the following components:    Troponin, High Sensitivity 52 (*)     All other components within normal limits   MYOGLOBIN, SERUM - Abnormal; Notable for the following components:    Myoglobin 105 (*)     All other components within normal limits   PHENYTOIN LEVEL, TOTAL - Abnormal; Notable for the following components:    Phenytoin Lvl <0.8 (*)     All other components within normal limits   TOX SCR, BLD, ED - Abnormal; Notable for the following components:    Acetaminophen Level <5 (*)     Salicylate Lvl <1 (*)     All other components within normal limits   TROPONIN - Abnormal; Notable for the following components:    Troponin, High Sensitivity 75 (*)     All other components within normal limits   TROPONIN - Abnormal; Notable for the following components:    Troponin, High Sensitivity 85 (*)     All other components within normal limits   COVID-19, RAPID   BRAIN NATRIURETIC PEPTIDE   PROCALCITONIN   CK   URINE RT REFLEX TO CULTURE   APTT   APTT   URINE DRUG SCREEN   URINE DRUG SCREEN   BLOOD GAS, ARTERIAL   HEPATITIS PANEL, ACUTE   APTT       CT Head WO Contrast    Result Date: 11/15/2021  EXAMINATION: CT OF THE HEAD WITHOUT CONTRAST  11/15/2021 4:13 am TECHNIQUE: CT of the head was performed without the administration of intravenous contrast. Dose modulation, iterative reconstruction, and/or weight based adjustment of the mA/kV was utilized to reduce the radiation dose to as low as reasonably achievable. COMPARISON: 11/20/2006.  HISTORY: ORDERING SYSTEM PROVIDED HISTORY: overdose, unknown if fall TECHNOLOGIST PROVIDED HISTORY: overdose, unknown if fall Decision Support Exception - unselect if not a suspected or confirmed emergency medical condition->Emergency Medical Condition (MA) Reason for Exam: drug overdose FINDINGS: BRAIN/VENTRICLES: There is no acute intracranial hemorrhage, mass effect or midline shift.  No abnormal extra-axial fluid collection. There is no acute infarct. 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 The appearance is unchanged from the 2006 exam.  There is no evidence of hydrocephalus. ORBITS: The visualized portion of the orbits demonstrate no acute abnormality. SINUSES: The visualized paranasal sinuses and mastoid air cells demonstrate no acute abnormality. SOFT TISSUES/SKULL:  No acute abnormality of the visualized skull or soft tissues. No acute intracranial abnormality. XR CHEST PORTABLE    Result Date: 11/15/2021  EXAMINATION: ONE XRAY VIEW OF THE CHEST 11/15/2021 4:35 am COMPARISON: 07/07/2006 HISTORY: ORDERING SYSTEM PROVIDED HISTORY: overdose, unknown if fall, r/o fracture, r/o pneumo TECHNOLOGIST PROVIDED HISTORY: overdose, unknown if fall, r/o fracture, r/o pneumo Reason for Exam: supine port, Overdose FINDINGS: Mild cardiomegaly increased from previous. Pulmonary vasculature appears mildly congested. The lungs are otherwise clear low lung volumes. Pneumothorax and pleural effusion are not excluded on this supine image. Surrounding structures are unremarkable. Findings suggestive of mild congestive failure. Otherwise negative exam. Pneumothorax is not excluded on this supine image. RECENT VITALS:     Temp: 97.5 °F (36.4 °C),  Pulse: 71, Resp: 15, BP: 112/77, SpO2: 100 %      This patient is a 50 y.o. Male with concern for OD. Patient received Narcan. Continue to be agitated. Received B-52. Received another 0.4 mg of Narcan after respiratory rate dropped to 6. Patient placed on Narcan drip here in the emergency department after failing as needed administration. Patient admitted to medicine. On heparin drip for uptrending troponins. No significant ST changes or repolarization normalities on EKG. History of seizures loaded with Dilantin.       ED Course as of 11/15/21 9454 Mon Nov 15, 2021   0455 Troponin, High Sensitivity(!!): 52 [JG]   0456 WBC(!): 13.4 [JG]   0506 Patient respirations decreased to 6/min, patient was given 0.4 mg narcan IV with subsequent increase in respirations to 12/min [JG]   0530 Patient respirations back down to 6/min, patient started on narcan drip [JG]   0614 Troponin, High Sensitivity(!!): 75 [JG]   [id number] Cardiology consulted for increasing troponins. Cardiology recommended getting a third troponin and if still uptrending, start on heparin drip [JG]   0713 Temperature increased to 97.8, measured orally. Patient able to answer basic questions, is unable to tell us what he took or what happened prior to arrival. Patient to be admitted for overdose on narcan drip, with uptrending troponins 53 > 75, leukocytosis, and restarting dilantin. Patient given loading dose of dilantin [JG]   0744 Troponin, High Sensitivity(!!): 85 [JG]   0745 3rd troponin uptrending at 85. Per cardiology recommendation, patient started on heparin drip. [JG]      ED Course User Index  [JG] Leisa Sam MD       OUTSTANDING TASKS / RECOMMENDATIONS:    1. Admission     FINAL IMPRESSION:     1. Drug overdose, undetermined intent, initial encounter    2. Elevated troponin        DISPOSITION:         DISPOSITION:  []  Discharge   []  Transfer -    [x]  Admission -     []  Against Medical Advice   []  Eloped   FOLLOW-UP: No follow-up provider specified.    DISCHARGE MEDICATIONS: New Prescriptions    No medications on file          Toro Ascencio MD  Emergency Medicine Resident  [facility]       Toro Ascencio MD  Resident  11/15/21 3510

## 2021-11-15 NOTE — ED NOTES
Pt brought to ED 1 via stretcher by EMS. Pt was found down under a downspout. Pt soaking wet, clothes removed. Pt would not hold still and was constantly screaming. Unable to assess pt due to these actions. Benadryl, Ativan and Haldol administered to pt. Pt placed on bear-hugger. IV established, pt on cardiac monitor. Will continue to monitor. Pt belongings bagged up in the room.        Milton Rossi RN  11/15/21 8400

## 2021-11-16 PROBLEM — J96.02 ACUTE RESPIRATORY FAILURE WITH HYPERCAPNIA (HCC): Status: ACTIVE | Noted: 2021-11-16

## 2021-11-16 LAB
ALBUMIN SERPL-MCNC: 3.8 G/DL (ref 3.5–5.2)
ALBUMIN/GLOBULIN RATIO: 1.3 (ref 1–2.5)
ALP BLD-CCNC: 80 U/L (ref 40–129)
ALT SERPL-CCNC: 71 U/L (ref 5–41)
ANION GAP SERPL CALCULATED.3IONS-SCNC: 14 MMOL/L (ref 9–17)
AST SERPL-CCNC: 62 U/L
BILIRUB SERPL-MCNC: 0.41 MG/DL (ref 0.3–1.2)
BUN BLDV-MCNC: 13 MG/DL (ref 6–20)
BUN/CREAT BLD: ABNORMAL (ref 9–20)
CALCIUM SERPL-MCNC: 8.7 MG/DL (ref 8.6–10.4)
CHLORIDE BLD-SCNC: 105 MMOL/L (ref 98–107)
CO2: 21 MMOL/L (ref 20–31)
CREAT SERPL-MCNC: 0.78 MG/DL (ref 0.7–1.2)
CULTURE: NORMAL
EKG ATRIAL RATE: 82 BPM
EKG P AXIS: 60 DEGREES
EKG P-R INTERVAL: 156 MS
EKG Q-T INTERVAL: 408 MS
EKG QRS DURATION: 92 MS
EKG QTC CALCULATION (BAZETT): 476 MS
EKG T AXIS: 24 DEGREES
EKG VENTRICULAR RATE: 82 BPM
GFR AFRICAN AMERICAN: >60 ML/MIN
GFR NON-AFRICAN AMERICAN: >60 ML/MIN
GFR SERPL CREATININE-BSD FRML MDRD: ABNORMAL ML/MIN/{1.73_M2}
GFR SERPL CREATININE-BSD FRML MDRD: ABNORMAL ML/MIN/{1.73_M2}
GLUCOSE BLD-MCNC: 106 MG/DL (ref 75–110)
GLUCOSE BLD-MCNC: 107 MG/DL (ref 75–110)
GLUCOSE BLD-MCNC: 107 MG/DL (ref 75–110)
GLUCOSE BLD-MCNC: 126 MG/DL (ref 75–110)
GLUCOSE BLD-MCNC: 97 MG/DL (ref 70–99)
HCT VFR BLD CALC: 40 % (ref 40.7–50.3)
HEMOGLOBIN: 12.1 G/DL (ref 13–17)
LV EF: 65 %
LVEF MODALITY: NORMAL
Lab: NORMAL
MCH RBC QN AUTO: 24.9 PG (ref 25.2–33.5)
MCHC RBC AUTO-ENTMCNC: 30.3 G/DL (ref 28.4–34.8)
MCV RBC AUTO: 82.3 FL (ref 82.6–102.9)
NRBC AUTOMATED: 0 PER 100 WBC
PARTIAL THROMBOPLASTIN TIME: 25.9 SEC (ref 20.5–30.5)
PARTIAL THROMBOPLASTIN TIME: 44.5 SEC (ref 20.5–30.5)
PARTIAL THROMBOPLASTIN TIME: 51.3 SEC (ref 20.5–30.5)
PDW BLD-RTO: 14.5 % (ref 11.8–14.4)
PHENYTOIN FREE: 0.8 UG/ML (ref 1–2)
PLATELET # BLD: 251 K/UL (ref 138–453)
PMV BLD AUTO: 10.1 FL (ref 8.1–13.5)
POTASSIUM SERPL-SCNC: 4.8 MMOL/L (ref 3.7–5.3)
RBC # BLD: 4.86 M/UL (ref 4.21–5.77)
SODIUM BLD-SCNC: 140 MMOL/L (ref 135–144)
SPECIMEN DESCRIPTION: NORMAL
TOTAL PROTEIN: 6.8 G/DL (ref 6.4–8.3)
TROPONIN INTERP: NORMAL
TROPONIN INTERP: NORMAL
TROPONIN T: NORMAL NG/ML
TROPONIN T: NORMAL NG/ML
TROPONIN, HIGH SENSITIVITY: 14 NG/L (ref 0–22)
TROPONIN, HIGH SENSITIVITY: 21 NG/L (ref 0–22)
WBC # BLD: 4.6 K/UL (ref 3.5–11.3)

## 2021-11-16 PROCEDURE — 6370000000 HC RX 637 (ALT 250 FOR IP): Performed by: INTERNAL MEDICINE

## 2021-11-16 PROCEDURE — 80186 ASSAY OF PHENYTOIN FREE: CPT

## 2021-11-16 PROCEDURE — 85730 THROMBOPLASTIN TIME PARTIAL: CPT

## 2021-11-16 PROCEDURE — 84484 ASSAY OF TROPONIN QUANT: CPT

## 2021-11-16 PROCEDURE — 82947 ASSAY GLUCOSE BLOOD QUANT: CPT

## 2021-11-16 PROCEDURE — 80053 COMPREHEN METABOLIC PANEL: CPT

## 2021-11-16 PROCEDURE — 93306 TTE W/DOPPLER COMPLETE: CPT

## 2021-11-16 PROCEDURE — 99233 SBSQ HOSP IP/OBS HIGH 50: CPT | Performed by: INTERNAL MEDICINE

## 2021-11-16 PROCEDURE — 85027 COMPLETE CBC AUTOMATED: CPT

## 2021-11-16 PROCEDURE — 2580000003 HC RX 258: Performed by: INTERNAL MEDICINE

## 2021-11-16 PROCEDURE — 36415 COLL VENOUS BLD VENIPUNCTURE: CPT

## 2021-11-16 PROCEDURE — 6360000002 HC RX W HCPCS: Performed by: INTERNAL MEDICINE

## 2021-11-16 PROCEDURE — 2060000000 HC ICU INTERMEDIATE R&B

## 2021-11-16 RX ORDER — DEXTROSE MONOHYDRATE 50 MG/ML
100 INJECTION, SOLUTION INTRAVENOUS PRN
Status: DISCONTINUED | OUTPATIENT
Start: 2021-11-16 | End: 2021-11-18 | Stop reason: HOSPADM

## 2021-11-16 RX ORDER — NICOTINE POLACRILEX 4 MG
15 LOZENGE BUCCAL PRN
Status: DISCONTINUED | OUTPATIENT
Start: 2021-11-16 | End: 2021-11-18 | Stop reason: HOSPADM

## 2021-11-16 RX ORDER — DEXTROSE MONOHYDRATE 25 G/50ML
12.5 INJECTION, SOLUTION INTRAVENOUS PRN
Status: DISCONTINUED | OUTPATIENT
Start: 2021-11-16 | End: 2021-11-18 | Stop reason: HOSPADM

## 2021-11-16 RX ADMIN — FAMOTIDINE 20 MG: 20 TABLET, FILM COATED ORAL at 08:41

## 2021-11-16 RX ADMIN — ENOXAPARIN SODIUM 40 MG: 100 INJECTION SUBCUTANEOUS at 21:46

## 2021-11-16 RX ADMIN — FAMOTIDINE 20 MG: 20 TABLET, FILM COATED ORAL at 21:46

## 2021-11-16 RX ADMIN — SODIUM CHLORIDE, PRESERVATIVE FREE 10 ML: 5 INJECTION INTRAVENOUS at 21:47

## 2021-11-16 ASSESSMENT — ENCOUNTER SYMPTOMS
NAUSEA: 0
COUGH: 0
SHORTNESS OF BREATH: 0
VOMITING: 0
ABDOMINAL PAIN: 0

## 2021-11-16 ASSESSMENT — PAIN SCALES - GENERAL
PAINLEVEL_OUTOF10: 0

## 2021-11-16 NOTE — ED NOTES
PT resting in bed. Not in any acute distress. VSS. Heparin infusing into patent IV.       Tolu Rosado RN  11/15/21 1950

## 2021-11-16 NOTE — CARE COORDINATION
Met with pt after receiving a social work consult for rehab. Pt states that he has been living in an abandoned home for quite some time. He states that he uses crack daily and really wants to quit. Discussed options for rehab. Informed pt that options are limited as he has no insurance. Pt states that he had Medicaid but admitted that he has never followed up with Medicaid so his case was closed. Pt is agreeable to [facility]. Called and spoke to Griselda Mcmahon at [facility]. They have beds available. Pt needs to do a phone assessment before he is accepted. Provided pt some clothes. Left [facility]'s phone number with pt's nurse as he is off the floor.

## 2021-11-16 NOTE — PLAN OF CARE
Problem: VIOLENT RESTRAINTS  Goal: Removal from restraints as soon as assessed to be safe  Outcome: Ongoing  Goal: No harm/injury to patient while restraints in use  Outcome: Ongoing  Goal: Patient's dignity will be maintained  Outcome: Ongoing     Problem: Falls - Risk of:  Goal: Will remain free from falls  Description: Will remain free from falls  Outcome: Ongoing  Goal: Absence of physical injury  Description: Absence of physical injury  Outcome: Ongoing     Problem:  Activity:  Goal: Risk for activity intolerance will decrease  Description: Risk for activity intolerance will decrease  Outcome: Ongoing     Problem: Coping:  Goal: Ability to adjust to condition or change in health will improve  Description: Ability to adjust to condition or change in health will improve  Outcome: Ongoing     Problem: Fluid Volume:  Goal: Ability to maintain a balanced intake and output will improve  Description: Ability to maintain a balanced intake and output will improve  Outcome: Ongoing     Problem: Health Behavior:  Goal: Ability to identify and utilize available resources and services will improve  Description: Ability to identify and utilize available resources and services will improve  Outcome: Ongoing  Goal: Ability to manage health-related needs will improve  Description: Ability to manage health-related needs will improve  Outcome: Ongoing     Problem: Metabolic:  Goal: Ability to maintain appropriate glucose levels will improve  Description: Ability to maintain appropriate glucose levels will improve  Outcome: Ongoing     Problem: Nutritional:  Goal: Maintenance of adequate nutrition will improve  Description: Maintenance of adequate nutrition will improve  Outcome: Ongoing  Goal: Progress toward achieving an optimal weight will improve  Description: Progress toward achieving an optimal weight will improve  Outcome: Ongoing     Problem: Physical Regulation:  Goal: Complications related to the disease process, condition or treatment will be avoided or minimized  Description: Complications related to the disease process, condition or treatment will be avoided or minimized  Outcome: Ongoing  Goal: Diagnostic test results will improve  Description: Diagnostic test results will improve  Outcome: Ongoing     Problem: Skin Integrity:  Goal: Risk for impaired skin integrity will decrease  Description: Risk for impaired skin integrity will decrease  Outcome: Ongoing     Problem: Tissue Perfusion:  Goal: Adequacy of tissue perfusion will improve  Description: Adequacy of tissue perfusion will improve  Outcome: Ongoing     Problem: Suicide risk  Goal: Provide patient with safe environment  Description: Provide patient with safe environment  Outcome: Ongoing

## 2021-11-16 NOTE — PROGRESS NOTES
None Identified   Food/Nutrient Intake Outcomes:  Food and Nutrient Intake  Physical Signs/Symptoms Outcomes:  Biochemical Data, GI Status, Fluid Status or Edema, Nutrition Focused Physical Findings, Skin, Weight     Discharge Planning:     Too soon to determine     Electronically signed by Jennifer Delgadillo RD, LD on 11/16/21 at 1:45 PM EST    Contact: [phone]

## 2021-11-16 NOTE — ED NOTES
This patient was admitted, but is boarding in Emergency room. ED  provided patient with AOD inpatient resources. Patient was also provided with mental health walk in clinic resources if dual treatment is needed. Patient appeared very tired, so left patient with options if he chooses at time of discharge. Patient accepting of material and agreed to look over and went back to sleep.        RAMESH Killian  11/16/21 7995

## 2021-11-16 NOTE — PROGRESS NOTES
overdose. Patient was found down, unresponsive by EMS and given 4 mg IN narcan en route. Patient awakened immediately and began screaming. Patient extremely combative. Unknown LKW, patient not able to verbalize if he has any pain or if he did any street drugs. Patient screaming and physically combative throughout his initial assessment. No overt signs of trauma on arrival. Per EMR, has a history of seizures and is supposed to be on dilantin 100 mg TID. Patient was kept on soft restraints which have been removed now  His cardiac enzymes were elevated for which he has been evaluated by cardiology and has been started on heparin drip  His creatinine is slightly elevated1.31, no previous numbers to compare  His LFT are also elevated, no previous numbers to compare  Blood glucose 248, no previous numbers to compare  Currently patient is awake and alert, answering appropriately, states he took some drugs orally, states he has been using IV drugs but is evasive when answering if he took anything this time\"    The intitial plan included:  \"Narcan drip is being discontinued  Continue heparin drip and follow cardiology evaluations and plan  Check for ABG, urine drug screen, hepatitis panel and A1c  Start oral diet if he is able to take orally now  Seizure precautions  Monitor labs in morning\"       Past Medical History:   has a past medical history of Seizures ([facility] [address] Social History:   reports that he has never smoked. He has never used smokeless tobacco. He reports that he does not drink alcohol and does not use drugs. Family History: No family history on file. Review of Systems:     Review of Systems   Constitutional: Positive for activity change (Decreased), appetite change (Diminished) and fatigue. Respiratory: Negative for cough and shortness of breath. Cardiovascular: Negative for chest pain and palpitations. Gastrointestinal: Negative for abdominal pain, nausea and vomiting.    Genitourinary: Negative for flank pain and hematuria. Psychiatric/Behavioral: Positive for dysphoric mood, sleep disturbance and suicidal ideas. The patient is nervous/anxious. Physical Examination:        Vitals  /76   Pulse 72   Temp 98.6 °F (37 °C) (Oral)   Resp 20   Ht 5' 10\" (1.778 m)   Wt 185 lb (83.9 kg)   SpO2 97%   BMI 26.54 kg/m²   Temp (24hrs), Av.4 °F (36.9 °C), Min:97.8 °F (36.6 °C), Max:98.6 °F (37 °C)    Recent Labs     11/15/21  2328 21  0822 21  1116 21  1610   POCGLU 71* 107 126* 106       Physical Exam  Vitals reviewed. Constitutional:       General: He is not in acute distress. Appearance: He is ill-appearing. He is not diaphoretic. HENT:      Head: Normocephalic. Nose: Nose normal.   Eyes:      General: No scleral icterus. Conjunctiva/sclera: Conjunctivae normal.   Neck:      Trachea: No tracheal deviation. Cardiovascular:      Rate and Rhythm: Normal rate and regular rhythm. Pulmonary:      Effort: Pulmonary effort is normal. No respiratory distress. Breath sounds: Normal breath sounds. No wheezing or rales. Chest:      Chest wall: No tenderness. Abdominal:      General: There is no distension. Palpations: Abdomen is soft. Tenderness: There is no abdominal tenderness. Musculoskeletal:         General: No tenderness. Cervical back: Neck supple. Skin:     General: Skin is warm and dry. Medications: Allergies:     Allergies   Allergen Reactions    Pcn [Penicillins]        Current Meds:   Scheduled Meds:    enoxaparin  40 mg SubCUTAneous Daily    [Held by provider] phenytoin  100 mg Oral TID    sodium chloride flush  5-40 mL IntraVENous 2 times per day    polyethylene glycol  17 g Oral Daily    famotidine  20 mg Oral BID    insulin lispro  0-6 Units SubCUTAneous TID     insulin lispro  0-3 Units SubCUTAneous Nightly     Continuous Infusions:    dextrose      sodium chloride       PRN Meds: glucose, dextrose, glucagon (rDNA), dextrose, heparin (porcine), heparin (porcine), sodium chloride flush, sodium chloride, ondansetron **OR** ondansetron    Data:     I/O (24Hr): Intake/Output Summary (Last 24 hours) at 11/16/2021 1815  Last data filed at 11/16/2021 1802  Gross per 24 hour   Intake 3315.4 ml   Output 1850 ml   Net 1465.4 ml       Labs:  Hematology:  Recent Labs     11/15/21  0358 11/15/21  1404 11/16/21  0637   WBC 13.4* 6.7 4.6   RBC 5.84* 4.57 4.86   HGB 14.4 11.5* 12.1*   HCT 49.5 37.0* 40.0*   MCV 84.8 81.0* 82.3*   MCH 24.7* 25.2 24.9*   MCHC 29.1 31.1 30.3   RDW 14.4 14.3 14.5*    217 251   MPV 9.4 9.5 10.1     Chemistry:  Recent Labs     11/15/21  0358 11/15/21  0358 11/15/21  0523 11/15/21  0701 11/15/21  1404 11/16/21  0350 11/16/21  0637     --   --   --  136  --  140   K 5.1  --   --   --  4.3  --  4.8   CL 97*  --   --   --  102  --  105   CO2 16*  --   --   --  22  --  21   GLUCOSE 248*  --   --   --  73  --  97   BUN 17  --   --   --  16  --  13   CREATININE 1.31*  --   --   --  0.76  --  0.78   MG  --   --  2.0  --   --   --   --    ANIONGAP 26*  --   --   --  12  --  14   LABGLOM 57*  --   --   --  >60  --  >60   GFRAA >60  --   --   --  >60  --  >60   CALCIUM 9.7  --   --   --  8.2*  --  8.7   PROBNP 69  --   --   --   --   --   --    TROPHS 52*   < > 75* 85*  --  21  --    CKTOTAL 258  --   --   --   --   --   --    MYOGLOBIN 105*  --   --   --   --   --   --     < > = values in this interval not displayed.      Recent Labs     11/15/21  0358 11/15/21  1125 11/15/21  1404 11/15/21  2328 11/16/21  0637 11/16/21  0822 11/16/21  1116 11/16/21  1610   PROT 8.5*  --  6.2*  --  6.8  --   --   --    LABALBU 4.9  --  3.5  --  3.8  --   --   --    LABA1C 5.4  --   --   --   --   --   --   --    *  --  107*  --  62*  --   --   --    *  --  88*  --  71*  --   --   --    ALKPHOS 110  --  72  --  80  --   --   --    BILITOT 0.50  --  0.39  --  0.41  --   --   -- PHENYTOIN <0.8*  --   --   --   --   --   --   --    POCGLU  --  84  --  71*  --  107 126* 106     ABG:  Lab Results   Component Value Date    POCPH 7.284 11/15/2021    POCPCO2 56.0 11/15/2021    POCPO2 118.1 11/15/2021    POCHCO3 26.5 11/15/2021    NBEA 1 11/15/2021    PBEA NOT REPORTED 11/15/2021    YYX7XIC NOT REPORTED 11/15/2021    WAYV8EYQ 98 11/15/2021    FIO2 NOT REPORTED 11/15/2021     Lab Results   Component Value Date/Time    SPECIAL NOT REPORTED 11/15/2021 12:06 PM     Lab Results   Component Value Date/Time    CULTURE NO SIGNIFICANT GROWTH 11/15/2021 12:06 PM       Radiology:  CT Head WO Contrast    Result Date: 11/15/2021  No acute intracranial abnormality. XR CHEST PORTABLE    Result Date: 11/15/2021  Findings suggestive of mild congestive failure. Otherwise negative exam. Pneumothorax is not excluded on this supine image.        Assessment:        Primary Problem  Intentional drug overdose [facility])    Active Hospital Problems    Diagnosis Date Noted    Acute respiratory failure with hypercapnia ([facility] [address] [J96.02] 11/16/2021    Intentional drug overdose ([facility] [address] [T50.902A]     Cocaine use [F14.90]     Suicidal ideation [R45.851]     History of seizurestakes Dilantin [Z87.898] 11/15/2021    Elevated troponin [R77.8] 11/15/2021    Elevated LFTs [R79.89] 11/15/2021    SYLVIA (acute kidney injury) ([facility] [address] [N17.9] 11/15/2021    Hyperglycemia [R73.9] 11/15/2021    Leukocytosis [D72.829] 11/15/2021         Plan:        Detox  Psych evaluation  Suicide precautions  Seizure precautions  Blood Pressure - Monitor and control   Check bun and creatinine   Trend trops  Cardiology evaluation  Rule out non-ST elevation MI  Awaiting echo  Trend LFTs  Glycemic contol - monitor and control blood sugars   Social service consult:  Drug Rehab program   DVT prophylaxis    IP CONSULT TO CARDIOLOGY  IP CONSULT TO HOSPITALIST  IP CONSULT TO PSYCHIATRY  CHEMICAL [address] [facility] [address] DO  11/16/2021  6:15 PM

## 2021-11-16 NOTE — PROGRESS NOTES
Pt admitted that the overdose was intentional but he does not want to hurt himself now.  Dr Mae Hauser notified suicide precautions were initiated

## 2021-11-16 NOTE — PROGRESS NOTES
[facility]  Progress Note                   Date:   11/16/2021  Patient name: Lisette Velez  Date of admission:  11/15/2021  3:19 AM  MRN:   [id number]  YOB: 1965  PCP: No primary care provider on file. Reason for Admission: Elevated troponin [R77.8]  Drug overdose, undetermined intent, initial encounter [T50.904A]  Drug overdose, accidental or unintentional, sequela [T50.901S]    Subjective:       Clinical Changes /Abnormalities: No acute CV issues/concerns overnight. Denies CP or SOB. Labs, vitals, tele reviewed. Echo pending. Review of Systems    Medications:   Scheduled Meds:   [Held by provider] phenytoin  100 mg Oral TID    sodium chloride flush  5-40 mL IntraVENous 2 times per day    polyethylene glycol  17 g Oral Daily    famotidine  20 mg Oral BID    insulin lispro  0-6 Units SubCUTAneous TID WC    insulin lispro  0-3 Units SubCUTAneous Nightly     Continuous Infusions:   heparin (PORCINE) Infusion 15.92 Units/kg/hr (11/15/21 2109)    sodium chloride       CBC:   Recent Labs     11/15/21  0358 11/15/21  1404 11/16/21  0637   WBC 13.4* 6.7 4.6   HGB 14.4 11.5* 12.1*    217 251     BMP:    Recent Labs     11/15/21  0358 11/15/21  1404 11/16/21  0637    136 140   K 5.1 4.3 4.8   CL 97* 102 105   CO2 16* 22 21   BUN 17 16 13   CREATININE 1.31* 0.76 0.78   GLUCOSE 248* 73 97     Hepatic:  Recent Labs     11/15/21  0358 11/15/21  1404 11/16/21  0637   * 107* 62*   * 88* 71*   BILITOT 0.50 0.39 0.41   ALKPHOS 110 72 80     Troponin:   Recent Labs     11/15/21  0523 11/15/21  0701 11/16/21  0350   TROPHS 75* 85* 21     BNP: No results for input(s): BNP in the last 72 hours. Lipids: No results for input(s): CHOL, HDL in the last 72 hours. Invalid input(s): LDLCALCU  INR: No results for input(s): INR in the last 72 hours.     Objective:   Vitals: /62   Pulse 85   Temp 98.6 °F (37 °C) (Oral)   Resp 16   Ht 5' 10\" (1.778 m)   Wt 185 lb (83.9 kg)   SpO2 97%   BMI 26.54 kg/m²   General appearance: alert and cooperative with exam  HEENT: Head: Normocephalic, no lesions, without obvious abnormality. Neck:no JVD, trachea midline, no adenopathy  Lungs: Clear to auscultation  Heart: Regular rate and rhythm, s1/s2 auscultated, no murmurs  Abdomen: soft, non-tender, bowel sounds active  Extremities: no edema  Neurologic: not done        Assessment / Acute Cardiac Problems:   1. Opioid overdose  2. SYLVIA  3. Type II MI likely secondary to above 1 & 2  4. Seizures disorder    Patient Active Problem List:     Drug overdose, accidental or unintentional, sequela     History of seizurestakes Dilantin     Troponin level elevated     Elevated LFTs     SYLVIA (acute kidney injury) ([facility] [address]     Hyperglycemia     Leukocytosis      Plan of Treatment:   1. Awaiting echo. 2. Trop reviewed and no trend to suggest ischemia. Can d/c Heparin gtt. 3. Keep K >4 and mg >2  4. If echo low risk will be OK for discharge from CV standpoint.      Electronically signed by SPENCER Kebede CNP on 11/16/2021 at 1:49 PM  [address]  [phone]

## 2021-11-17 PROBLEM — F33.9 RECURRENT MAJOR DEPRESSIVE DISORDER (HCC): Status: ACTIVE | Noted: 2021-11-17

## 2021-11-17 LAB
ALBUMIN SERPL-MCNC: 3.5 G/DL (ref 3.5–5.2)
ALBUMIN/GLOBULIN RATIO: 1.2 (ref 1–2.5)
ALP BLD-CCNC: 75 U/L (ref 40–129)
ALT SERPL-CCNC: 49 U/L (ref 5–41)
ANION GAP SERPL CALCULATED.3IONS-SCNC: 8 MMOL/L (ref 9–17)
AST SERPL-CCNC: 32 U/L
BILIRUB SERPL-MCNC: 0.29 MG/DL (ref 0.3–1.2)
BILIRUBIN DIRECT: 0.09 MG/DL
BILIRUBIN, INDIRECT: 0.2 MG/DL (ref 0–1)
BUN BLDV-MCNC: 10 MG/DL (ref 6–20)
CALCIUM SERPL-MCNC: 8.8 MG/DL (ref 8.6–10.4)
CHLORIDE BLD-SCNC: 102 MMOL/L (ref 98–107)
CO2: 25 MMOL/L (ref 20–31)
CREAT SERPL-MCNC: 0.76 MG/DL (ref 0.7–1.2)
GFR AFRICAN AMERICAN: >60 ML/MIN
GFR NON-AFRICAN AMERICAN: >60 ML/MIN
GFR SERPL CREATININE-BSD FRML MDRD: ABNORMAL ML/MIN/{1.73_M2}
GFR SERPL CREATININE-BSD FRML MDRD: ABNORMAL ML/MIN/{1.73_M2}
GLUCOSE BLD-MCNC: 116 MG/DL (ref 70–99)
GLUCOSE BLD-MCNC: 129 MG/DL (ref 75–110)
GLUCOSE BLD-MCNC: 134 MG/DL (ref 75–110)
GLUCOSE BLD-MCNC: 138 MG/DL (ref 75–110)
GLUCOSE BLD-MCNC: 139 MG/DL (ref 75–110)
PARTIAL THROMBOPLASTIN TIME: 24.6 SEC (ref 20.5–30.5)
POTASSIUM SERPL-SCNC: 3.9 MMOL/L (ref 3.7–5.3)
SODIUM BLD-SCNC: 135 MMOL/L (ref 135–144)
TOTAL PROTEIN: 6.4 G/DL (ref 6.4–8.3)
TROPONIN INTERP: NORMAL
TROPONIN INTERP: NORMAL
TROPONIN T: NORMAL NG/ML
TROPONIN T: NORMAL NG/ML
TROPONIN, HIGH SENSITIVITY: 10 NG/L (ref 0–22)
TROPONIN, HIGH SENSITIVITY: 13 NG/L (ref 0–22)

## 2021-11-17 PROCEDURE — 99232 SBSQ HOSP IP/OBS MODERATE 35: CPT | Performed by: INTERNAL MEDICINE

## 2021-11-17 PROCEDURE — 2060000000 HC ICU INTERMEDIATE R&B

## 2021-11-17 PROCEDURE — 90792 PSYCH DIAG EVAL W/MED SRVCS: CPT | Performed by: PSYCHIATRY & NEUROLOGY

## 2021-11-17 PROCEDURE — 36415 COLL VENOUS BLD VENIPUNCTURE: CPT

## 2021-11-17 PROCEDURE — 6360000002 HC RX W HCPCS: Performed by: INTERNAL MEDICINE

## 2021-11-17 PROCEDURE — 80053 COMPREHEN METABOLIC PANEL: CPT

## 2021-11-17 PROCEDURE — 84484 ASSAY OF TROPONIN QUANT: CPT

## 2021-11-17 PROCEDURE — 6370000000 HC RX 637 (ALT 250 FOR IP): Performed by: INTERNAL MEDICINE

## 2021-11-17 PROCEDURE — 82248 BILIRUBIN DIRECT: CPT

## 2021-11-17 PROCEDURE — 2580000003 HC RX 258: Performed by: INTERNAL MEDICINE

## 2021-11-17 PROCEDURE — 82947 ASSAY GLUCOSE BLOOD QUANT: CPT

## 2021-11-17 PROCEDURE — 85730 THROMBOPLASTIN TIME PARTIAL: CPT

## 2021-11-17 RX ORDER — PHENYTOIN SODIUM 300 MG/1
300 CAPSULE, EXTENDED RELEASE ORAL ONCE
Status: COMPLETED | OUTPATIENT
Start: 2021-11-17 | End: 2021-11-17

## 2021-11-17 RX ADMIN — PHENYTOIN SODIUM 300 MG: 300 CAPSULE, EXTENDED RELEASE ORAL at 18:13

## 2021-11-17 RX ADMIN — POLYETHYLENE GLYCOL 3350 17 G: 17 POWDER, FOR SOLUTION ORAL at 09:33

## 2021-11-17 RX ADMIN — ENOXAPARIN SODIUM 40 MG: 100 INJECTION SUBCUTANEOUS at 09:33

## 2021-11-17 RX ADMIN — SODIUM CHLORIDE, PRESERVATIVE FREE 10 ML: 5 INJECTION INTRAVENOUS at 21:22

## 2021-11-17 RX ADMIN — SODIUM CHLORIDE, PRESERVATIVE FREE 10 ML: 5 INJECTION INTRAVENOUS at 09:33

## 2021-11-17 RX ADMIN — FAMOTIDINE 20 MG: 20 TABLET, FILM COATED ORAL at 21:09

## 2021-11-17 RX ADMIN — PHENYTOIN 100 MG: 50 TABLET, CHEWABLE ORAL at 21:09

## 2021-11-17 RX ADMIN — FAMOTIDINE 20 MG: 20 TABLET, FILM COATED ORAL at 09:33

## 2021-11-17 ASSESSMENT — ENCOUNTER SYMPTOMS
VOMITING: 0
SHORTNESS OF BREATH: 0
NAUSEA: 0
COUGH: 0
ABDOMINAL PAIN: 0

## 2021-11-17 NOTE — BH NOTE
Patient was seen. He needs to have a sitter and will be admitted to psychiatry once medically cleared.     Detailed consult note will follow    Gregorio Garcia MD

## 2021-11-17 NOTE — PROGRESS NOTES
[facility]  Office: [address], DO, Ayla Angel, DO, Jasmine Patelmagi, DO, Ritu Rascon Tai, DO, Katie Gongora MD, Eileen Perez MD, Roderick Abreu MD, Misbah Saeed MD, Daisy Fields MD, Rosa Gallegos MD, Renata Arvizu MD, Donavan Marcial, DO, Azul Floyd DO, Gina Coyle MD,  Rafaela Rogers, DO, Rosy Schirmer, MD, Bia Higgins MD, America Naranjo MD, Carrie Coello MD, Jessica Castro MD, Azael Loomis MD, Velta Bernheim, MD, Juli Bassett [facility], [facility], CNP, Hussein Hendrix, CNP, Myles Ozuna, CNS, Chacorta Sharma, CNP, Daniel Mullins, CNP, Maddy Jackson, CNP, Geraldine Moraes, CNP, Dakota Hilton, CNP, Eunice Lees PA-C, Anna Byrne, [facility] [place], Aiyana Lauren, CNP, Isa Dixon, CNP, Megan Abernathy, CNP, Itzel Madison, CNP, Rachel Izaguirre, CNP, Aristeo Harris, CNP, Jasbir [address]    Progress Note    11/17/2021    5:23 PM    Name:   Ulisses Vaughn  MRN:     [id number]     [place]:      [de-identified]   Room:   [address] [place] [address] Day:  2  Admit Date:  11/15/2021  3:19 AM    PCP:   No primary care provider on file. Code Status:  Full Code    Subjective:     C/C:   Chief Complaint   Patient presents with    Drug Overdose     found down, 4mg Narcan given PTA     Interval History Status:   More alert  More conversant  No chest pain  No shortness of breath    Data Base Updates:  Troponin, High [id number]     Phenytoin, Free0.8 Low      Echo satisfactory: See below      Brief History:     As documented in the medical record:  \"Admitted through ER with following information:  Ulisses Vaughn is a 50 y.o. male who presents with suspected drug overdose. Patient was found down, unresponsive by EMS and given 4 mg IN narcan en route. Patient awakened immediately and began screaming. Patient extremely combative. Unknown LKW, patient not able to verbalize if he has any pain or if he did any street drugs.  Patient screaming and physically combative throughout his initial assessment. No overt signs of trauma on arrival. Per EMR, has a history of seizures and is supposed to be on dilantin 100 mg TID. Patient was kept on soft restraints which have been removed now  His cardiac enzymes were elevated for which he has been evaluated by cardiology and has been started on heparin drip  His creatinine is slightly elevated1.31, no previous numbers to compare  His LFT are also elevated, no previous numbers to compare  Blood glucose 248, no previous numbers to compare  Currently patient is awake and alert, answering appropriately, states he took some drugs orally, states he has been using IV drugs but is evasive when answering if he took anything this time\"    The intitial plan included:  \"Narcan drip is being discontinued  Continue heparin drip and follow cardiology evaluations and plan  Check for ABG, urine drug screen, hepatitis panel and A1c  Start oral diet if he is able to take orally now  Seizure precautions  Monitor labs in morning\"    Echo:  Left ventricle is normal in size. Global left ventricular systolic function   is normal.   Calculated ejection fraction 65% by Heart Model. Left atrium is normal in size. Right atrium is normal in size. Normal right ventricular size and function. Aortic valve is trileaflet and opens adequately. Mild aortic insufficiency. Normal mitral valve structure and function. No mitral regurgitation. Mild tricuspid regurgitation. No pulmonary hypertension. Estimated right ventricular systolic pressure is   16HFLB. No significant pericardial effusion is seen. Past Medical History:   has a past medical history of Seizures ([address] Social History:   reports that he has never smoked. He has never used smokeless tobacco. He reports that he does not drink alcohol and does not use drugs. Family History: No family history on file.     Review of Systems:     Review of Systems   Constitutional: Positive for fatigue. Activity change: Decreased. Respiratory: Negative for cough and shortness of breath. Cardiovascular: Negative for chest pain and palpitations. Gastrointestinal: Negative for abdominal pain, nausea and vomiting. Genitourinary: Negative for flank pain and hematuria. Musculoskeletal: Positive for gait problem (Chronic right knee pain, awaiting TKA at [facility]). Psychiatric/Behavioral: Positive for dysphoric mood, sleep disturbance (Slept a little better) and suicidal ideas (Has considered slashing his own throat). Physical Examination:        Vitals  /68   Pulse 77   Temp 98.3 °F (36.8 °C) (Oral)   Resp 21   Ht 5' 10\" (1.778 m)   Wt 185 lb (83.9 kg)   SpO2 90%   BMI 26.54 kg/m²   Temp (24hrs), Av.6 °F (37 °C), Min:98.1 °F (36.7 °C), Max:99 °F (37.2 °C)    Recent Labs     21  2123 21  0829 21  1147 21  1605   POCGLU 107 138* 129* 139*       Physical Exam  Vitals reviewed. Constitutional:       General: He is not in acute distress. Appearance: He is ill-appearing. He is not diaphoretic. HENT:      Head: Normocephalic. Nose: Nose normal.   Eyes:      General: No scleral icterus. Conjunctiva/sclera: Conjunctivae normal.   Neck:      Trachea: No tracheal deviation. Cardiovascular:      Rate and Rhythm: Normal rate and regular rhythm. Pulmonary:      Effort: Pulmonary effort is normal. No respiratory distress. Breath sounds: Normal breath sounds. No wheezing or rales. Chest:      Chest wall: No tenderness. Abdominal:      General: There is no distension. Palpations: Abdomen is soft. Tenderness: There is no abdominal tenderness. Musculoskeletal:         General: Swelling and deformity present. No tenderness. Cervical back: Neck supple. Comments: Chronic hypertrophic changes of the right knee with a history of trauma. Awaiting TKA [facility]   Skin:     General: Skin is warm and dry.        Medications: Allergies: Allergies   Allergen Reactions    Pcn [Penicillins]        Current Meds:   Scheduled Meds:    enoxaparin  40 mg SubCUTAneous Daily    [Held by provider] phenytoin  100 mg Oral TID    sodium chloride flush  5-40 mL IntraVENous 2 times per day    polyethylene glycol  17 g Oral Daily    famotidine  20 mg Oral BID    insulin lispro  0-6 Units SubCUTAneous TID     insulin lispro  0-3 Units SubCUTAneous Nightly     Continuous Infusions:    dextrose      sodium chloride       PRN Meds: glucose, dextrose, glucagon (rDNA), dextrose, sodium chloride flush, sodium chloride, ondansetron **OR** ondansetron    Data:     I/O (24Hr):     Intake/Output Summary (Last 24 hours) at 11/17/2021 1723  Last data filed at 11/17/2021 1150  Gross per 24 hour   Intake 1515.4 ml   Output 3750 ml   Net -2234.6 ml       Labs:  Hematology:  Recent Labs     11/15/21  0358 11/15/21  1404 11/16/21  0637   WBC 13.4* 6.7 4.6   RBC 5.84* 4.57 4.86   HGB 14.4 11.5* 12.1*   HCT 49.5 37.0* 40.0*   MCV 84.8 81.0* 82.3*   MCH 24.7* 25.2 24.9*   MCHC 29.1 31.1 30.3   RDW 14.4 14.3 14.5*    217 251   MPV 9.4 9.5 10.1     Chemistry:  Recent Labs     11/15/21  0358 11/15/21  0358 11/15/21  0523 11/15/21  0701 11/15/21  1404 11/16/21  0350 11/16/21  0637 11/16/21  1846 11/17/21  0146 11/17/21  0535 11/17/21  1018     --   --    < > 136  --  140  --   --  135  --    K 5.1  --   --    < > 4.3  --  4.8  --   --  3.9  --    CL 97*  --   --    < > 102  --  105  --   --  102  --    CO2 16*  --   --    < > 22  --  21  --   --  25  --    GLUCOSE 248*  --   --    < > 73  --  97  --   --  116*  --    BUN 17  --   --    < > 16  --  13  --   --  10  --    CREATININE 1.31*  --   --    < > 0.76  --  0.78  --   --  0.76  --    MG  --   --  2.0  --   --   --   --   --   --   --   --    ANIONGAP 26*  --   --    < > 12  --  14  --   --  8*  --    LABGLOM 57*  --   --    < > >60  --  >60  --   --  >60  --    GFRAA >60  --   --    < > >60 --  >60  --   --  >60  --    CALCIUM 9.7  --   --    < > 8.2*  --  8.7  --   --  8.8  --    PROBNP 69  --   --   --   --   --   --   --   --   --   --    TROPHS 52*   < > 75*   < >  --    < >  --  14 13  --  10   CKTOTAL 258  --   --   --   --   --   --   --   --   --   --    MYOGLOBIN 105*  --   --   --   --   --   --   --   --   --   --     < > = values in this interval not displayed. Recent Labs     11/15/21  0358 11/15/21  1125 11/15/21  1404 11/15/21  2328 11/16/21  8907 11/16/21  0822 11/16/21  1116 11/16/21  1610 11/16/21  1846 11/16/21  2123 11/17/21  0535 11/17/21  0829 11/17/21  1147 11/17/21  1605   PROT 8.5*  --  6.2*  --  6.8  --   --   --   --   --  6.4  --   --   --    LABALBU 4.9  --  3.5  --  3.8  --   --   --   --   --  3.5  --   --   --    LABA1C 5.4  --   --   --   --   --   --   --   --   --   --   --   --   --    *  --  107*  --  62*  --   --   --   --   --  32  --   --   --    *  --  88*  --  71*  --   --   --   --   --  49*  --   --   --    ALKPHOS 110  --  72  --  80  --   --   --   --   --  75  --   --   --    BILITOT 0.50  --  0.39  --  0.41  --   --   --   --   --  0.29*  --   --   --    BILIDIR  --   --   --   --   --   --   --   --   --   --  0.09  --   --   --    PHENYTOIN <0.8*  --   --   --   --   --   --   --   --   --   --   --   --   --    PHENYF  --   --   --   --   --   --   --   --  0.8*  --   --   --   --   --    POCGLU  --    < >  --    < >  --    < > 126* 106  --  107  --  138* 129* 139*    < > = values in this interval not displayed.      ABG:  Lab Results   Component Value Date    POCPH 7.284 11/15/2021    POCPCO2 56.0 11/15/2021    POCPO2 118.1 11/15/2021    POCHCO3 26.5 11/15/2021    NBEA 1 11/15/2021    PBEA NOT REPORTED 11/15/2021    CGM6KFX NOT REPORTED 11/15/2021    CKFR3EHE 98 11/15/2021    FIO2 NOT REPORTED 11/15/2021     Lab Results   Component Value Date/Time    SPECIAL NOT REPORTED 11/15/2021 12:06 PM     Lab Results   Component Value Date/Time CULTURE NO SIGNIFICANT GROWTH 11/15/2021 12:06 PM       Radiology:  CT Head WO Contrast    Result Date: 11/15/2021  No acute intracranial abnormality. XR CHEST PORTABLE    Result Date: 11/15/2021  Findings suggestive of mild congestive failure. Otherwise negative exam. Pneumothorax is not excluded on this supine image.        Assessment:        Primary Problem  Intentional drug overdose [facility])    Active Hospital Problems    Diagnosis Date Noted    Recurrent major depressive disorder ([facility] [address] [F33.9] 11/17/2021    Acute respiratory failure with hypercapnia (HCC) [J96.02] 11/16/2021    Intentional drug overdose ([facility] [address] [T50.902A]     Cocaine use [F14.90]     Suicidal ideation [R45.851]     History of seizurestakes Dilantin [Z87.898] 11/15/2021    Elevated troponin [R77.8] 11/15/2021    Elevated LFTs [R79.89] 11/15/2021    SYLVIA (acute kidney injury) ([facility] [address] [N17.9] 11/15/2021    Hyperglycemia [R73.9] 11/15/2021    Leukocytosis [D72.829] 11/15/2021         Plan:        Detox  Psych evaluation  Suicide precautions  Seizure precautions  Blood Pressure - Monitor and control   Check bun and creatinine   Trend trops  Cardiology eval & f/u as scheduled   Rule out non-ST elevation MI  Trend LFTs  Glycemic contol - monitor and control blood sugars   Social service consult:  Drug Rehab program   DVT prophylaxis  The patient's condition is stabilizing  [facility] per   DR Salena Granados MD  Ortho follow-up [facility] for TKA    IP CONSULT TO CARDIOLOGY  IP CONSULT TO HOSPITALIST  IP CONSULT TO [address] [place] [address]  11/17/2021  5:23 PM

## 2021-11-17 NOTE — PLAN OF CARE
Problem: Falls - Risk of:  Goal: Will remain free from falls  Description: Will remain free from falls  Outcome: Ongoing     Problem: Falls - Risk of:  Goal: Absence of physical injury  Description: Absence of physical injury  Outcome: Ongoing     Problem: Coping:  Goal: Ability to adjust to condition or change in health will improve  Description: Ability to adjust to condition or change in health will improve  Outcome: Ongoing     Problem: Fluid Volume:  Goal: Ability to maintain a balanced intake and output will improve  Description: Ability to maintain a balanced intake and output will improve  Outcome: Ongoing     Problem: Metabolic:  Goal: Ability to maintain appropriate glucose levels will improve  Description: Ability to maintain appropriate glucose levels will improve  Outcome: Ongoing

## 2021-11-17 NOTE — CONSULTS
Department of Psychiatry  Behavioral Health Consult    REASON FOR CONSULT: Jadonos    CONSULTING PHYSICIAN: Dr. Flor Ortiz    History obtained from: Patient and chart    HISTORY OF PRESENT ILLNESS:    The patient is a 50 y.o. male with significant past psychiatric history of MECHE who presents with an overdose. It is documented that the patient was found to be unresponsive. EMS were called. The patient received 4 mg of Narcan with very good effect. The patient was combative and was screaming through the initial assessment. The patient was given Dilantin due to suspicions about a history of seizures. It is noted that his creatinine was elevated at 1.31. Patient was seen using telehealth equipment  The patient was willing to engage in the conversation. The patient reports feeling very depressed. He reports feeling suicidal.  He feels unable to cope because of losing control of his substance use. He says he is tired of doing drugs. The patient denies trying to deliberately kill himself. He denies using opioids. He states his main drug abuse is crack cocaine. The patient denies any auditory or visual hallucinations. He denies any delusions. He has no psychotic phenomena. The patient denies any symptoms of PTSD including nightmares and flashbacks. The patient is not currently receiving care for the above psychiatric illness. Psychiatric Review of Systems        ·    Obsessions and Compulsions: Denies    ·    Neelam or Hypomania: Denies  ·    Hallucinations: Denies  ·    Panic Attacks:  Denies  ·    Delusions:  Denies  ·    Phobias:  Denies  ·    Trauma: Denies      Substance Abuse History:  ETOH: beer every now then  Marijuana: occasional  Opiates: none  Other Drugs: crack.        Past Psychiatric History:  Prior Diagnosis: Depression    Hospitalization: Denies  Hx of Suicidal Attempts: no    The patient does not recall being treated with psychotropic medications in the past.    Personal History: [place] streets. Has family but doesn't have a good relationship. Past Medical History:        Diagnosis Date    Seizures [facility])        Past Surgical History:    No past surgical history on file. Medications Prior to Admission:   Medications Prior to Admission: phenytoin (DILANTIN) 50 MG tablet, Take 100 mg by mouth 3 times daily    Allergies:  Pcn [penicillins]    FAMILY/SOCIAL HISTORY: Two aunts committed suicide. They were both crack cocaine user. No family history on file. Social History     Socioeconomic History    Marital status: Single     Spouse name: Not on file    Number of children: Not on file    Years of education: Not on file    Highest education level: Not on file   Occupational History    Not on file   Tobacco Use    Smoking status: Never Smoker    Smokeless tobacco: Never Used   Substance and Sexual Activity    Alcohol use: No    Drug use: No    Sexual activity: Not on file   Other Topics Concern    Not on file   Social History Narrative    Not on file     Social Determinants of Health     Financial Resource Strain:     Difficulty of Paying Living Expenses: Not on file   Food Insecurity:     Worried About Running Out of Food in the Last Year: Not on file    Ervin of Food in the Last Year: Not on file   Transportation Needs:     Lack of Transportation (Medical): Not on file    Lack of Transportation (Non-Medical):  Not on file   Physical Activity:     Days of Exercise per Week: Not on file    Minutes of Exercise per Session: Not on file   Stress:     Feeling of Stress : Not on file   Social Connections:     Frequency of Communication with Friends and Family: Not on file    Frequency of Social Gatherings with Friends and Family: Not on file    Attends Muslim Services: Not on file    Active Member of Clubs or Organizations: Not on file    Attends Club or Organization Meetings: Not on file    Marital Status: Not on file   Intimate Partner Violence:     Fear of Current or Ex-Partner: Not on file    Emotionally Abused: Not on file    Physically Abused: Not on file    Sexually Abused: Not on file   Housing Stability:     Unable to Pay for Housing in the Last Year: Not on file    Number of Places Lived in the Last Year: Not on file    Unstable Housing in the Last Year: Not on file       REVIEW OF SYSTEMS    Constitutional: [] fever  [] chills  [] weight loss  []weakness [] Other:  Eyes:  [] photophobia  [] discharge [] acuity change   [] Diplopia   [] Other:  HENT:  [] sore throat  [] ear pain [] Tinnitus   [] Other  Respiratory:  [] Cough  [] Shortness of breath   [] Sputum   [] Other:   Cardiac: []Chest pain   []Palpitations []Edema  []PND  [] Other:  GI:  []Abdominal pain   []Nausea  []Vomiting  []Diarrhea  [] Other:  :  [] Dysuria   []Frequency  []Hematuria  []Discharge  [] Other:  Possible Pregnancy: []Yes   []No   LMP:   Musculoskeletal:  []Back pain  []Neck pain  []Recent Injury   Skin:  []Rash  [] Itching  [] Other:  Neurologic:  [] Headache  [] Focal weakness  [] Sensory changes []Other:  Endocrine:  [] Polyuria  [] Polydipsia  [] Hair Loss  [] Other:  Lymphatic:   [] Swollen glands   Psychiatric:  As per HPI      All other systems negative except as marked or mentioned/indicated in the HPI. Karlie Kiser      PHYSICAL EXAM:  Vitals:  /68   Pulse 77   Temp 98.3 °F (36.8 °C) (Oral)   Resp 21   Ht 5' 10\" (1.778 m)   Wt 185 lb (83.9 kg)   SpO2 90%   BMI 26.54 kg/m²      Neuro Exam:       Involuntary Movements: No    Mental Status Examination:    Level of consciousness:  within normal limits   Appearance:  fair grooming and fair hygiene  Behavior/Motor:  no abnormalities noted  Attitude toward examiner:  cooperative  Speech:  spontaneous, normal rate, normal volume and loud   Mood: anxious and depressed  Affect:  mood congruent  Thought processes:  linear, goal directed and coherent   Thought content:  Suicidal Ideation:  active  Delusions:  no evidence of delusions  Perceptual Disturbance:  denies any perceptual disturbance  Cognition:  oriented to person, place, and time   Concentration intact  Memory intact  Insight fair   Judgement poor   Fund of Knowledge adequate        LABS: REVIEWED TODAY:  Recent Labs     11/15/21  0358 11/15/21  1404 11/16/21  0637   WBC 13.4* 6.7 4.6   HGB 14.4 11.5* 12.1*    217 251     Recent Labs     11/15/21  1404 11/16/21  0637 11/17/21  0535    140 135   K 4.3 4.8 3.9    105 102   CO2 22 21 25   BUN 16 13 10   CREATININE 0.76 0.78 0.76   GLUCOSE 73 97 116*     Recent Labs     11/15/21  1404 11/16/21  0637 11/17/21  0535   BILITOT 0.39 0.41 0.29*   ALKPHOS 72 80 75   * 62* 32   ALT 88* 71* 49*     Lab Results   Component Value Date    BARBSCNU NEGATIVE 11/15/2021    LABBENZ NEGATIVE 11/15/2021    LABMETH NEGATIVE 11/15/2021    PPXUR NOT REPORTED 11/15/2021     No results found for: TSH, FREET4  No results found for: LITHIUM  No results found for: VALPROATE, CBMZ  No results found for: LITHIUM, VALPROATE    FURTHER LABS ORDERED :      Radiology   ECHO Complete 2D W Doppler W Color    Result Date: 11/17/2021  Transthoracic Echocardiography Report (TTE)  Patient Name Royal Santamaria       Date of Study               11/16/2021               [address]   Date of      1965  Gender                      Male  Birth   Age          50 year(s)  Race                        Black   Room Number  9921        Height:                     70 inch, 177.8 cm   Corporate ID [id number]    Weight:                     185 pounds, 83.9 kg  #   Patient Acct [de-identified]   BSA:          2.02 m^2      BMI:     26.54 kg/m^2  #   MR #         [id number]     Sonographer                 Jennifer Fuller   Accession #  [id number]  Interpreting Physician      [address]   Fellow                   Referring Nurse                           Practitioner   Interpreting             Referring Physician         [address]  Fellow  Type of Study   TTE procedure:2D Echocardiogram, M-Mode, Doppler, Color Doppler. Procedure Date Date: 11/16/2021 Start: 02:03 PM Study Location: [facility] Technical Quality: Adequate visualization Indications:LV Function and Elevated troponin. History / Tech. Comments: Procedure explained to patient. Patient Status: Inpatient Height: 70 inches Weight: 185 pounds BSA: 2.02 m^2 BMI: 26.54 kg/m^2 HR: 78 bpm CONCLUSIONS Summary Left ventricle is normal in size. Global left ventricular systolic function is normal. Calculated ejection fraction 65% by Heart Model. Left atrium is normal in size. Right atrium is normal in size. Normal right ventricular size and function. Aortic valve is trileaflet and opens adequately. Mild aortic insufficiency. Normal mitral valve structure and function. No mitral regurgitation. Mild tricuspid regurgitation. No pulmonary hypertension. Estimated right ventricular systolic pressure is 27GQQN. No significant pericardial effusion is seen. Signature ----------------------------------------------------------------------------  Electronically signed by Danelle Alvarado(Sonographer) on 11/16/2021  02:35 PM ---------------------------------------------------------------------------- ----------------------------------------------------------------------------  Electronically signed by Antwan Bustamante(Interpreting physician) on 11/17/2021  09:21 AM ---------------------------------------------------------------------------- FINDINGS Left Atrium Left atrium is normal in size. Left Ventricle Left ventricle is normal in size. Global left ventricular systolic function is normal. Calculated ejection fraction 65% by Heart Model. Right Atrium Right atrium is normal in size. Right Ventricle Normal right ventricular size and function. TAPSE value of 2.96cm noted. Mitral Valve Normal mitral valve structure and function. No mitral regurgitation. Aortic Valve Aortic valve is trileaflet and opens adequately.  Mild aortic insufficiency. Tricuspid Valve No obvious valvular abnormality. Mild tricuspid regurgitation. No pulmonary hypertension. Estimated right ventricular systolic pressure is 80XXVL. Pulmonic Valve The pulmonic valve is normal in structure. Mild pulmonic insufficiency. Pericardial Effusion No significant pericardial effusion is seen. Miscellaneous E/E' average = 8.5. IVC normal diameter & inspiratory collapse indicating normal RA filling pressure .  M-mode / 2D Measurements & Calculations:   LVIDd:4.5 cm(3.7 - 5.6 cm)       Diastolic UWISS ml  OWTNJ:2.9 cm(2.2 - 4.0 cm)       Systolic YVBLRM:15 ml  IVSd:1 cm(0.6 - 1.1 cm)          Aortic Root:2.7 cm(2.0 - 3.7 cm)  LVPWd:1 cm(0.6 - 1.1 cm)         LA Dimension: 2.8 cm(1.9 - 4.0 cm)  Fractional Shortenin %       LA volume/Index: 34.9 ml /17m^2  Calculated LVEF (%): 65.31 %     LVOT:2.1 cm                                   RVDd:3.1 cm   Mitral:                                 Aortic   Valve Area (P1/2-Time): 4.58 cm^2       Peak Velocity: 1.51 m/s  Peak E-Wave: 0.71 m/s                   Mean Velocity: 0.92 m/s  Peak A-Wave: 0.58 m/s                   Peak Gradient: 9.12 mmHg  E/A Ratio: 1.21                         Mean Gradient: 4 mmHg  Peak Gradient: 2 mmHg  Mean Gradient: 2 mmHg  Deceleration Time: 165 msec             Area (continuity): 2.24 cm^2  P1/2t: 48 msec                          AV VTI: 25.4 cm   Area (continuity): 1.83 cm^2  Mean Velocity: 0.59 m/s   Tricuspid:                              Pulmonic:   Peak TR Velocity: 2.48 m/s              Peak Velocity: 1.09 m/s  Peak TR Gradient: 24.6016 mmHg          Peak Gradient: 4.75 mmHg  Diastology / Tissue Doppler Septal Wall E' velocity:0.07 m/s Septal Wall E/E':9.7 Lateral Wall E' velocity:0.10 m/s Lateral Wall E/E':7.2    CT Head WO Contrast    Result Date: 11/15/2021  EXAMINATION: CT OF THE HEAD WITHOUT CONTRAST  11/15/2021 4:13 am TECHNIQUE: CT of the head was performed without the administration of intravenous contrast. Dose modulation, iterative reconstruction, and/or weight based adjustment of the mA/kV was utilized to reduce the radiation dose to as low as reasonably achievable. COMPARISON: 11/20/2006. HISTORY: ORDERING SYSTEM PROVIDED HISTORY: overdose, unknown if fall TECHNOLOGIST PROVIDED HISTORY: overdose, unknown if fall Decision Support Exception - unselect if not a suspected or confirmed emergency medical condition->Emergency Medical Condition (MA) Reason for Exam: drug overdose FINDINGS: BRAIN/VENTRICLES: There is no acute intracranial hemorrhage, mass effect or midline shift. No abnormal extra-axial fluid collection. There is no acute infarct. 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 The appearance is unchanged from the 2006 exam.  There is no evidence of hydrocephalus. ORBITS: The visualized portion of the orbits demonstrate no acute abnormality. SINUSES: The visualized paranasal sinuses and mastoid air cells demonstrate no acute abnormality. SOFT TISSUES/SKULL:  No acute abnormality of the visualized skull or soft tissues. No acute intracranial abnormality. XR CHEST PORTABLE    Result Date: 11/15/2021  EXAMINATION: ONE XRAY VIEW OF THE CHEST 11/15/2021 4:35 am COMPARISON: 07/07/2006 HISTORY: ORDERING SYSTEM PROVIDED HISTORY: overdose, unknown if fall, r/o fracture, r/o pneumo TECHNOLOGIST PROVIDED HISTORY: overdose, unknown if fall, r/o fracture, r/o pneumo Reason for Exam: supine port, Overdose FINDINGS: Mild cardiomegaly increased from previous. Pulmonary vasculature appears mildly congested. The lungs are otherwise clear low lung volumes. Pneumothorax and pleural effusion are not excluded on this supine image. Surrounding structures are unremarkable. Findings suggestive of mild congestive failure. Otherwise negative exam. Pneumothorax is not excluded on this supine image.        EKG: TRACING

## 2021-11-17 NOTE — PLAN OF CARE
Problem: Falls - Risk of:  Goal: Will remain free from falls  Description: Will remain free from falls  Outcome: Ongoing     Problem: Falls - Risk of:  Goal: Absence of physical injury  Description: Absence of physical injury  Outcome: Ongoing     Problem:  Activity:  Goal: Risk for activity intolerance will decrease  Description: Risk for activity intolerance will decrease  Outcome: Ongoing     Problem: Coping:  Goal: Ability to adjust to condition or change in health will improve  Description: Ability to adjust to condition or change in health will improve  Outcome: Ongoing     Problem: Fluid Volume:  Goal: Ability to maintain a balanced intake and output will improve  Description: Ability to maintain a balanced intake and output will improve  Outcome: Ongoing     Problem: Health Behavior:  Goal: Ability to identify and utilize available resources and services will improve  Description: Ability to identify and utilize available resources and services will improve  Outcome: Ongoing     Problem: Health Behavior:  Goal: Ability to manage health-related needs will improve  Description: Ability to manage health-related needs will improve  Outcome: Ongoing     Problem: Metabolic:  Goal: Ability to maintain appropriate glucose levels will improve  Description: Ability to maintain appropriate glucose levels will improve  Outcome: Ongoing     Problem: Nutritional:  Goal: Maintenance of adequate nutrition will improve  Description: Maintenance of adequate nutrition will improve  Outcome: Ongoing     Problem: Nutritional:  Goal: Progress toward achieving an optimal weight will improve  Description: Progress toward achieving an optimal weight will improve  Outcome: Ongoing     Problem: Physical Regulation:  Goal: Complications related to the disease process, condition or treatment will be avoided or minimized  Description: Complications related to the disease process, condition or treatment will be avoided or minimized  Outcome: Ongoing     Problem: Physical Regulation:  Goal: Diagnostic test results will improve  Description: Diagnostic test results will improve  Outcome: Ongoing     Problem: Skin Integrity:  Goal: Risk for impaired skin integrity will decrease  Description: Risk for impaired skin integrity will decrease  Outcome: Ongoing     Problem: Tissue Perfusion:  Goal: Adequacy of tissue perfusion will improve  Description: Adequacy of tissue perfusion will improve  Outcome: Ongoing     Problem: Suicide risk  Goal: Provide patient with safe environment  Description: Provide patient with safe environment  Outcome: Ongoing

## 2021-11-17 NOTE — PROGRESS NOTES
[facility]  Progress Note                   Date:   11/17/2021  Patient name: Lay Villalobos  Date of admission:  11/15/2021  3:19 AM  MRN:   [id number]  YOB: 1965  PCP: No primary care provider on file. Reason for Admission: Elevated troponin [R77.8]  Drug overdose, undetermined intent, initial encounter [T50.904A]  Drug overdose, accidental or unintentional, sequela [T50.901S]    Subjective:       Clinical Changes /Abnormalities: No acute CV issues/concerns overnight. . Labs, vitals, tele reviewed. Echo reviewed. Review of Systems    Medications:   Scheduled Meds:   enoxaparin  40 mg SubCUTAneous Daily    [Held by provider] phenytoin  100 mg Oral TID    sodium chloride flush  5-40 mL IntraVENous 2 times per day    polyethylene glycol  17 g Oral Daily    famotidine  20 mg Oral BID    insulin lispro  0-6 Units SubCUTAneous TID WC    insulin lispro  0-3 Units SubCUTAneous Nightly     Continuous Infusions:   dextrose      sodium chloride       CBC:   Recent Labs     11/15/21  0358 11/15/21  1404 11/16/21  0637   WBC 13.4* 6.7 4.6   HGB 14.4 11.5* 12.1*    217 251     BMP:    Recent Labs     11/15/21  1404 11/16/21  0637 11/17/21  0535    140 135   K 4.3 4.8 3.9    105 102   CO2 22 21 25   BUN 16 13 10   CREATININE 0.76 0.78 0.76   GLUCOSE 73 97 116*     Hepatic:  Recent Labs     11/15/21  1404 11/16/21  0637 11/17/21  0535   * 62* 32   ALT 88* 71* 49*   BILITOT 0.39 0.41 0.29*   ALKPHOS 72 80 75     Troponin:   Recent Labs     11/16/21  1846 11/17/21  0146 11/17/21  1018   TROPHS 14 13 10     BNP: No results for input(s): BNP in the last 72 hours. Lipids: No results for input(s): CHOL, HDL in the last 72 hours. Invalid input(s): LDLCALCU  INR: No results for input(s): INR in the last 72 hours.     Objective:   Vitals: /68   Pulse 77   Temp 98.3 °F (36.8 °C) (Oral)   Resp 21   Ht 5' 10\" (1.778 m)   Wt 185 lb (83.9 kg)   SpO2 90% BMI 26.54 kg/m²   General appearance: alert and cooperative with exam  HEENT: Head: Normocephalic, no lesions, without obvious abnormality. Neck:no JVD, trachea midline, no adenopathy  Lungs: Clear to auscultation  Heart: Regular rate and rhythm, s1/s2 auscultated, no murmurs  Abdomen: soft, non-tender, bowel sounds active  Extremities: no edema  Neurologic: not done    Echo 11/16/21  Summary  Left ventricle is normal in size. Global left ventricular systolic function  is normal.  Calculated ejection fraction 65% by Heart Model. Left atrium is normal in size. Right atrium is normal in size. Normal right ventricular size and function. Aortic valve is trileaflet and opens adequately. Mild aortic insufficiency. Normal mitral valve structure and function. No mitral regurgitation. Mild tricuspid regurgitation. No pulmonary hypertension. Estimated right ventricular systolic pressure is  [id number]. No significant pericardial effusion is seen. Assessment / Acute Cardiac Problems:   1. Opioid overdose  2. SYLVIA  3. Type II MI likely secondary to above 1 & 2  4. Seizures disorder    Patient Active Problem List:     Drug overdose, accidental or unintentional, sequela     History of seizurestakes Dilantin     Troponin level elevated     Elevated LFTs     SYLVIA (acute kidney injury) ([address]     Hyperglycemia     Leukocytosis      Plan of Treatment:   1. Echo as above. Normal LVEF without significant WMA or valvular disease. No need for further ischemic work-up at this time. 2. OK for discharge from CV standpoint. Will sign off. Please call with further questions/concerns.      Electronically signed by SPENCER Ceballos CNP on 11/17/2021 at 1:43 PM  [address]  [phone]

## 2021-11-18 ENCOUNTER — HOSPITAL ENCOUNTER (INPATIENT)
Age: 56
LOS: 8 days | Discharge: HOME | DRG: 751 | End: 2021-11-26
Attending: PSYCHIATRY & NEUROLOGY | Admitting: PSYCHIATRY & NEUROLOGY
Payer: MEDICARE

## 2021-11-18 VITALS
BODY MASS INDEX: 23.23 KG/M2 | HEART RATE: 88 BPM | HEIGHT: 70 IN | DIASTOLIC BLOOD PRESSURE: 80 MMHG | SYSTOLIC BLOOD PRESSURE: 116 MMHG | TEMPERATURE: 98.4 F | RESPIRATION RATE: 15 BRPM | WEIGHT: 162.26 LBS | OXYGEN SATURATION: 97 %

## 2021-11-18 PROBLEM — F33.2 MAJOR DEPRESSIVE DISORDER, RECURRENT SEVERE WITHOUT PSYCHOTIC FEATURES (HCC): Status: ACTIVE | Noted: 2021-11-18

## 2021-11-18 LAB
GLUCOSE BLD-MCNC: 116 MG/DL (ref 75–110)
GLUCOSE BLD-MCNC: 119 MG/DL (ref 75–110)
GLUCOSE BLD-MCNC: 145 MG/DL (ref 75–110)
GLUCOSE BLD-MCNC: 176 MG/DL (ref 75–110)
PARTIAL THROMBOPLASTIN TIME: 24.7 SEC (ref 20.5–30.5)
SARS-COV-2, RAPID: NOT DETECTED
SPECIMEN DESCRIPTION: NORMAL

## 2021-11-18 PROCEDURE — 6370000000 HC RX 637 (ALT 250 FOR IP): Performed by: INTERNAL MEDICINE

## 2021-11-18 PROCEDURE — 82947 ASSAY GLUCOSE BLOOD QUANT: CPT

## 2021-11-18 PROCEDURE — 99232 SBSQ HOSP IP/OBS MODERATE 35: CPT | Performed by: INTERNAL MEDICINE

## 2021-11-18 PROCEDURE — 85730 THROMBOPLASTIN TIME PARTIAL: CPT

## 2021-11-18 PROCEDURE — 1240000000 HC EMOTIONAL WELLNESS R&B

## 2021-11-18 PROCEDURE — 2580000003 HC RX 258: Performed by: INTERNAL MEDICINE

## 2021-11-18 PROCEDURE — 87635 SARS-COV-2 COVID-19 AMP PRB: CPT

## 2021-11-18 PROCEDURE — 94761 N-INVAS EAR/PLS OXIMETRY MLT: CPT

## 2021-11-18 PROCEDURE — 36415 COLL VENOUS BLD VENIPUNCTURE: CPT

## 2021-11-18 PROCEDURE — 6360000002 HC RX W HCPCS: Performed by: INTERNAL MEDICINE

## 2021-11-18 RX ORDER — ACETAMINOPHEN 325 MG/1
650 TABLET ORAL EVERY 4 HOURS PRN
Status: DISCONTINUED | OUTPATIENT
Start: 2021-11-18 | End: 2021-11-26 | Stop reason: HOSPADM

## 2021-11-18 RX ORDER — MAGNESIUM HYDROXIDE/ALUMINUM HYDROXICE/SIMETHICONE 120; 1200; 1200 MG/30ML; MG/30ML; MG/30ML
30 SUSPENSION ORAL EVERY 6 HOURS PRN
Status: DISCONTINUED | OUTPATIENT
Start: 2021-11-18 | End: 2021-11-26 | Stop reason: HOSPADM

## 2021-11-18 RX ORDER — TRAZODONE HYDROCHLORIDE 50 MG/1
50 TABLET ORAL NIGHTLY PRN
Status: DISCONTINUED | OUTPATIENT
Start: 2021-11-18 | End: 2021-11-26 | Stop reason: HOSPADM

## 2021-11-18 RX ORDER — POLYETHYLENE GLYCOL 3350 17 G/17G
17 POWDER, FOR SOLUTION ORAL DAILY PRN
Status: DISCONTINUED | OUTPATIENT
Start: 2021-11-18 | End: 2021-11-26 | Stop reason: HOSPADM

## 2021-11-18 RX ORDER — HYDROXYZINE 50 MG/1
50 TABLET, FILM COATED ORAL 3 TIMES DAILY PRN
Status: DISCONTINUED | OUTPATIENT
Start: 2021-11-18 | End: 2021-11-26 | Stop reason: HOSPADM

## 2021-11-18 RX ORDER — IBUPROFEN 400 MG/1
400 TABLET ORAL EVERY 6 HOURS PRN
Status: DISCONTINUED | OUTPATIENT
Start: 2021-11-18 | End: 2021-11-26 | Stop reason: HOSPADM

## 2021-11-18 RX ADMIN — POLYETHYLENE GLYCOL 3350 17 G: 17 POWDER, FOR SOLUTION ORAL at 09:19

## 2021-11-18 RX ADMIN — INSULIN LISPRO 1 UNITS: 100 INJECTION, SOLUTION INTRAVENOUS; SUBCUTANEOUS at 12:55

## 2021-11-18 RX ADMIN — PHENYTOIN 100 MG: 50 TABLET, CHEWABLE ORAL at 14:05

## 2021-11-18 RX ADMIN — PHENYTOIN 100 MG: 50 TABLET, CHEWABLE ORAL at 08:48

## 2021-11-18 RX ADMIN — PHENYTOIN 100 MG: 50 TABLET, CHEWABLE ORAL at 20:25

## 2021-11-18 RX ADMIN — SODIUM CHLORIDE, PRESERVATIVE FREE 10 ML: 5 INJECTION INTRAVENOUS at 08:54

## 2021-11-18 RX ADMIN — ENOXAPARIN SODIUM 40 MG: 100 INJECTION SUBCUTANEOUS at 08:48

## 2021-11-18 RX ADMIN — INSULIN LISPRO 1 UNITS: 100 INJECTION, SOLUTION INTRAVENOUS; SUBCUTANEOUS at 20:25

## 2021-11-18 RX ADMIN — FAMOTIDINE 20 MG: 20 TABLET, FILM COATED ORAL at 20:25

## 2021-11-18 RX ADMIN — SODIUM CHLORIDE, PRESERVATIVE FREE 10 ML: 5 INJECTION INTRAVENOUS at 20:25

## 2021-11-18 RX ADMIN — FAMOTIDINE 20 MG: 20 TABLET, FILM COATED ORAL at 08:48

## 2021-11-18 ASSESSMENT — ENCOUNTER SYMPTOMS
SHORTNESS OF BREATH: 0
VOMITING: 0
NAUSEA: 0
COUGH: 0
ABDOMINAL PAIN: 0

## 2021-11-18 ASSESSMENT — PAIN SCALES - GENERAL
PAINLEVEL_OUTOF10: 0
PAINLEVEL_OUTOF10: 0

## 2021-11-18 NOTE — PROGRESS NOTES
[facility]  Office: [address], DO, Eliecer Pole, DO, Deepak [place], DO, Bear Johnson Blood, DO, Violeta Wooten MD, Nury Mercedes MD, Andrew Barney MD, Rigoberto Patel MD, Seamus Xiao MD, Pricila Finn MD, Abi Rossi MD, Ricco Beard, DO, Fili Canchola, DO, Caity Ramsey MD,  Saida Pickett, DO, Paula Arriaga MD, Terence Mendoza MD, Taqueria Pitts MD, Georgi Machado MD, Chip Del Toro MD, Lizz Raphael MD, Filipe Hendrix MD, Apollo Estrada [facility], [facility], [facility], Jocelin Schmitt, [facility], Ya Rea, CNS, Stephen Snyder, CNP, Claudia Singleton, CNP, Jah Barriga, CNP, Norman De La Cruz, CNP, Rebel Anand, CNP, Martha Kendrick PA-C, Radha Pickard [facility], Sirena Ram, CNP, Mandeep Contreras, CNP, Boubacar Beard, CNP, Clint Allen, CNP, Michelle Og, CNP, Arturo Dasilva, CNP, Edgar Hanna, [facility]    Progress Note    11/18/2021    5:59 PM    Name:   Rodger Larry  MRN:     [id number]     [place]:      [de-identified]   Room:   [address] [place] [address] Day:  3  Admit Date:  11/15/2021  3:19 AM    PCP:   No primary care provider on file. Code Status:  Full Code    Subjective:     C/C:   Chief Complaint   Patient presents with    Drug Overdose     found down, 4mg Narcan given PTA     Interval History Status:   Improving  Feels better  Affect brighter  Appetite improved  Awaiting BHI    Data Base Updates:  SARS-CoV-2, RapidNot Detected     [id number] High      Troponin, High [id number]     Brief History:     As documented in the medical record:  \"Admitted through ER with following information:  Rodger Larry is a 50 y.o. male who presents with suspected drug overdose. Patient was found down, unresponsive by EMS and given 4 mg IN narcan en route. Patient awakened immediately and began screaming. Patient extremely combative. Unknown LKW, patient not able to verbalize if he has any pain or if he did any street drugs.  Patient screaming and physically combative throughout his initial assessment. No overt signs of trauma on arrival. Per EMR, has a history of seizures and is supposed to be on dilantin 100 mg TID. Patient was kept on soft restraints which have been removed now  His cardiac enzymes were elevated for which he has been evaluated by cardiology and has been started on heparin drip  His creatinine is slightly elevated1.31, no previous numbers to compare  His LFT are also elevated, no previous numbers to compare  Blood glucose 248, no previous numbers to compare  Currently patient is awake and alert, answering appropriately, states he took some drugs orally, states he has been using IV drugs but is evasive when answering if he took anything this time\"    The intitial plan included:  \"Narcan drip is being discontinued  Continue heparin drip and follow cardiology evaluations and plan  Check for ABG, urine drug screen, hepatitis panel and A1c  Start oral diet if he is able to take orally now  Seizure precautions  Monitor labs in morning\"    Echo:  Left ventricle is normal in size. Global left ventricular systolic function   is normal.   Calculated ejection fraction 65% by Heart Model. Left atrium is normal in size. Right atrium is normal in size. Normal right ventricular size and function. Aortic valve is trileaflet and opens adequately. Mild aortic insufficiency. Normal mitral valve structure and function. No mitral regurgitation. Mild tricuspid regurgitation. No pulmonary hypertension. Estimated right ventricular systolic pressure is   [id number]. No significant pericardial effusion is seen. The patient's condition was stabilized  Transfer to [facility] initiated    Past Medical History:   has a past medical history of Seizures ([address] Social History:   reports that he has never smoked. He has never used smokeless tobacco. He reports that he does not drink alcohol and does not use drugs.      Family History: No family history on file.    Review of Systems:     Review of Systems   Constitutional: Positive for fatigue (Feeling stronger). Activity change: Decreased. Respiratory: Negative for cough and shortness of breath. Cardiovascular: Negative for chest pain and palpitations. Gastrointestinal: Negative for abdominal pain, nausea and vomiting. Genitourinary: Negative for flank pain and hematuria. Musculoskeletal: Positive for arthralgias and gait problem (Chronic right knee pain, awaiting TKA at [address] Psychiatric/Behavioral: Positive for dysphoric mood, sleep disturbance (Slept a little better) and suicidal ideas (Has considered slashing his own throat). Physical Examination:        Vitals  /83   Pulse 75   Temp 98.2 °F (36.8 °C) (Oral)   Resp 16   Ht 5' 10\" (1.778 m)   Wt 162 lb 4.1 oz (73.6 kg)   SpO2 99%   BMI 23.28 kg/m²   Temp (24hrs), Av.1 °F (36.7 °C), Min:98 °F (36.7 °C), Max:98.2 °F (36.8 °C)    Recent Labs     21  2108 21  0846 21  1252 21  1748   POCGLU 134* 116* 176* 119*       Physical Exam  Vitals reviewed. Constitutional:       General: He is not in acute distress. Appearance: He is ill-appearing. He is not diaphoretic. HENT:      Head: Normocephalic. Nose: Nose normal.   Eyes:      General: No scleral icterus. Conjunctiva/sclera: Conjunctivae normal.   Neck:      Trachea: No tracheal deviation. Cardiovascular:      Rate and Rhythm: Normal rate and regular rhythm. Pulmonary:      Effort: Pulmonary effort is normal. No respiratory distress. Breath sounds: Normal breath sounds. No wheezing or rales. Chest:      Chest wall: No tenderness. Abdominal:      General: There is no distension. Palpations: Abdomen is soft. Tenderness: There is no abdominal tenderness. Musculoskeletal:         General: Swelling and deformity present. No tenderness. Cervical back: Neck supple.       Comments: Chronic hypertrophic changes of the right knee with a history of trauma. Awaiting TKA [facility] [place]   Skin:     General: Skin is warm and dry. Psychiatric:      Comments: Affect brighter        Medications: Allergies: Allergies   Allergen Reactions    Pcn [Penicillins]        Current Meds:   Scheduled Meds:    enoxaparin  40 mg SubCUTAneous Daily    phenytoin  100 mg Oral TID    sodium chloride flush  5-40 mL IntraVENous 2 times per day    polyethylene glycol  17 g Oral Daily    famotidine  20 mg Oral BID    insulin lispro  0-6 Units SubCUTAneous TID WC    insulin lispro  0-3 Units SubCUTAneous Nightly     Continuous Infusions:    dextrose      sodium chloride       PRN Meds: glucose, dextrose, glucagon (rDNA), dextrose, sodium chloride flush, sodium chloride, ondansetron **OR** ondansetron    Data:     I/O (24Hr): Intake/Output Summary (Last 24 hours) at 11/18/2021 1759  Last data filed at 11/18/2021 1755  Gross per 24 hour   Intake    Output 1975 ml   Net -1975 ml       Labs:  Hematology:  Recent Labs     11/16/21 0637   WBC 4.6   RBC 4.86   HGB 12.1*   HCT 40.0*   MCV 82.3*   MCH 24.9*   MCHC 30.3   RDW 14.5*      MPV 10.1     Chemistry:  Recent Labs     11/16/21  0350 11/16/21  0637 11/16/21  1846 11/17/21  0146 11/17/21  0535 11/17/21  1018   NA  --  140  --   --  135  --    K  --  4.8  --   --  3.9  --    CL  --  105  --   --  102  --    CO2  --  21  --   --  25  --    GLUCOSE  --  97  --   --  116*  --    BUN  --  13  --   --  10  --    CREATININE  --  0.78  --   --  0.76  --    ANIONGAP  --  14  --   --  8*  --    LABGLOM  --  >60  --   --  >60  --    GFRAA  --  >60  --   --  >60  --    CALCIUM  --  8.7  --   --  8.8  --    TROPHS   < >  --  14 13  --  10    < > = values in this interval not displayed.      Recent Labs     11/16/21  0637 11/16/21  0822 11/16/21  1846 11/16/21  2123 11/17/21  0535 11/17/21  0829 11/17/21  1147 11/17/21  1605 11/17/21  2108 11/18/21  0846 11/18/21  1252 11/18/21  1748   PROT 6.8  --   -- --  6.4  --   --   --   --   --   --   --    LABALBU 3.8  --   --   --  3.5  --   --   --   --   --   --   --    AST 62*  --   --   --  32  --   --   --   --   --   --   --    ALT 71*  --   --   --  49*  --   --   --   --   --   --   --    ALKPHOS 80  --   --   --  75  --   --   --   --   --   --   --    BILITOT 0.41  --   --   --  0.29*  --   --   --   --   --   --   --    BILIDIR  --   --   --   --  0.09  --   --   --   --   --   --   --    PHENYF  --   --  0.8*  --   --   --   --   --   --   --   --   --    POCGLU  --    < >  --    < >  --    < > 129* 139* 134* 116* 176* 119*    < > = values in this interval not displayed. ABG:  Lab Results   Component Value Date    POCPH 7.284 11/15/2021    POCPCO2 56.0 11/15/2021    POCPO2 118.1 11/15/2021    POCHCO3 26.5 11/15/2021    NBEA 1 11/15/2021    PBEA NOT REPORTED 11/15/2021    UGX3RRN NOT REPORTED 11/15/2021    IKEE3FRM 98 11/15/2021    FIO2 NOT REPORTED 11/15/2021     Lab Results   Component Value Date/Time    SPECIAL NOT REPORTED 11/15/2021 12:06 PM     Lab Results   Component Value Date/Time    CULTURE NO SIGNIFICANT GROWTH 11/15/2021 12:06 PM       Radiology:  CT Head WO Contrast    Result Date: 11/15/2021  No acute intracranial abnormality. XR CHEST PORTABLE    Result Date: 11/15/2021  Findings suggestive of mild congestive failure. Otherwise negative exam. Pneumothorax is not excluded on this supine image.        Assessment:        Primary Problem  Intentional drug overdose [facility])    Active Hospital Problems    Diagnosis Date Noted    Recurrent major depressive disorder ([facility] [F33.9] 11/17/2021    Acute respiratory failure with hypercapnia (HCC) [J96.02] 11/16/2021    Intentional drug overdose ([facility] [T50.902A]     Cocaine use [F14.90]     Suicidal ideation [R45.851]     History of seizurestakes Dilantin [Z87.898] 11/15/2021    Elevated troponin [R77.8] 11/15/2021    Elevated LFTs [R79.89] 11/15/2021    SYLVIA (acute kidney injury) ([facility] [N17.9] 11/15/2021    Hyperglycemia [R73.9] 11/15/2021    Leukocytosis [D72.829] 11/15/2021       Plan:        Detox  Psych evaluation  Suicide precautions  Seizure precautions  Blood Pressure - Monitor and control   Check bun and creatinine   Trend trops  Cardiology eval & f/u as scheduled   Rule out non-ST elevation MI  Trend LFTs  Glycemic contol - monitor and control blood sugars   Social service consult:  Drug Rehab program   DVT prophylaxis  The patient's condition is stable   Will discharge to [facility] when arrangements complete and ok with other services.   Follow-up with PCP in one week,   Notify PCP of discharge   DR Osmani Carter MD  Ortho follow-up [facility] for TKA    IP CONSULT TO CARDIOLOGY  IP CONSULT TO HOSPITALIST  IP CONSULT TO PSYCHIATRY  CHEMICAL DEPENDENCY REFERRAL FOR Haroldo Hope DO  11/18/2021  5:59 PM

## 2021-11-18 NOTE — PLAN OF CARE
Problem: Falls - Risk of:  Goal: Will remain free from falls  Description: Will remain free from falls  11/18/2021 0452 by Sarah Bartlett RN  Outcome: Met This Shift  11/17/2021 1858 by Orin Sheppard RN  Outcome: Ongoing     Problem: Falls - Risk of:  Goal: Absence of physical injury  Description: Absence of physical injury  11/18/2021 0452 by Sarah Bartlett RN  Outcome: Met This Shift     Problem:  Activity:  Goal: Risk for activity intolerance will decrease  Description: Risk for activity intolerance will decrease  11/18/2021 0452 by Sarah Bartlett RN  Outcome: Met This Shift     Problem: Coping:  Goal: Ability to adjust to condition or change in health will improve  Description: Ability to adjust to condition or change in health will improve  11/18/2021 0452 by Sarah Bartlett RN  Outcome: Met This Shift     Problem: Fluid Volume:  Goal: Ability to maintain a balanced intake and output will improve  Description: Ability to maintain a balanced intake and output will improve  11/18/2021 0452 by Sarah Bartlett RN  Outcome: Met This Shift     Problem: Metabolic:  Goal: Ability to maintain appropriate glucose levels will improve  Description: Ability to maintain appropriate glucose levels will improve  11/18/2021 0452 by Sarah Bartlett RN  Outcome: Met This Shift     Problem: Nutritional:  Goal: Maintenance of adequate nutrition will improve  Description: Maintenance of adequate nutrition will improve  11/18/2021 0452 by Sarah Bartlett RN  Outcome: Met This Shift     Problem: Nutritional:  Goal: Progress toward achieving an optimal weight will improve  Description: Progress toward achieving an optimal weight will improve  11/18/2021 0452 by Sarah Bartlett RN  Outcome: Met This Shift     Problem: Physical Regulation:  Goal: Diagnostic test results will improve  Description: Diagnostic test results will improve  11/18/2021 0452 by Sarah Bartlett RN  Outcome: Met This Shift     Problem: Skin Integrity:  Goal: Risk for impaired skin integrity will decrease  Description: Risk for impaired skin integrity will decrease  11/18/2021 0452 by Ivelisse Wharton RN  Outcome: Met This Shift     Problem: Tissue Perfusion:  Goal: Adequacy of tissue perfusion will improve  Description: Adequacy of tissue perfusion will improve  11/18/2021 0452 by Ivelisse Wharton RN  Outcome: Met This Shift     Problem: Health Behavior:  Goal: Ability to identify and utilize available resources and services will improve  Description: Ability to identify and utilize available resources and services will improve  11/18/2021 0452 by Ivelisse Wharton RN  Outcome: Ongoing     Problem: Health Behavior:  Goal: Ability to manage health-related needs will improve  Description: Ability to manage health-related needs will improve  11/18/2021 0452 by Ivelisse Wharton RN  Outcome: Ongoing     Problem: Physical Regulation:  Goal: Complications related to the disease process, condition or treatment will be avoided or minimized  Description: Complications related to the disease process, condition or treatment will be avoided or minimized  11/18/2021 0452 by Ivelisse Wharton RN  Outcome: Ongoing     Problem: Suicide risk  Goal: Provide patient with safe environment  Description: Provide patient with safe environment  11/18/2021 0452 by Ivelisse Wharton RN  Outcome: Ongoing     Problem: VIOLENT RESTRAINTS  Goal: Removal from restraints as soon as assessed to be safe  Outcome: Completed     Problem: VIOLENT RESTRAINTS  Goal: No harm/injury to patient while restraints in use  Outcome: Completed     Problem: VIOLENT RESTRAINTS  Goal: Patient's dignity will be maintained  Outcome: Completed

## 2021-11-18 NOTE — CARE COORDINATION
Transitional Planning    Pt medically cleared for [facility], Dr. Ashish Sosa PS for rapid covid test     1200 contacted [facility] to initiate transfer to [address] [place] [address]. Spoke with Dr. Cinthya Barriga who accepts patient. Gilberto Stockton, charge RN requests voluntary admission faxed to [phone].  Patient signs voluntary admissions paper and writer faxes to provided number    [phone] with [phone] [facility], transport ETA 2330 tonight, updated [address] [place] [address] of transport time      # for report  [phone]

## 2021-11-18 NOTE — PROGRESS NOTES
Patient in good spirits this morning, laughing, states doesn't remember any previous conversations regarding hurting himself or others. We discussed the seriousness of the previous comments he made and that it would be treated as such and we would follow the appropriate steps at discharge,  Patient agreeable.

## 2021-11-19 PROBLEM — F33.3 MAJOR DEPRESSIVE DISORDER, RECURRENT, SEVERE WITH PSYCHOTIC FEATURES (HCC): Status: ACTIVE | Noted: 2021-11-19

## 2021-11-19 PROBLEM — F31.5: Status: ACTIVE | Noted: 2021-11-19

## 2021-11-19 PROCEDURE — 1240000000 HC EMOTIONAL WELLNESS R&B

## 2021-11-19 PROCEDURE — 6370000000 HC RX 637 (ALT 250 FOR IP): Performed by: PSYCHIATRY & NEUROLOGY

## 2021-11-19 PROCEDURE — 99223 1ST HOSP IP/OBS HIGH 75: CPT | Performed by: INTERNAL MEDICINE

## 2021-11-19 PROCEDURE — 6370000000 HC RX 637 (ALT 250 FOR IP)

## 2021-11-19 PROCEDURE — APPSS60 APP SPLIT SHARED TIME 46-60 MINUTES

## 2021-11-19 PROCEDURE — 99223 1ST HOSP IP/OBS HIGH 75: CPT | Performed by: PSYCHIATRY & NEUROLOGY

## 2021-11-19 RX ORDER — ESCITALOPRAM OXALATE 10 MG/1
5 TABLET ORAL DAILY
Status: DISCONTINUED | OUTPATIENT
Start: 2021-11-19 | End: 2021-11-21

## 2021-11-19 RX ORDER — PHENYTOIN 50 MG/1
100 TABLET, CHEWABLE ORAL 3 TIMES DAILY
Status: DISCONTINUED | OUTPATIENT
Start: 2021-11-19 | End: 2021-11-26 | Stop reason: HOSPADM

## 2021-11-19 RX ADMIN — TRAZODONE HYDROCHLORIDE 50 MG: 50 TABLET ORAL at 22:01

## 2021-11-19 RX ADMIN — PHENYTOIN 100 MG: 50 TABLET, CHEWABLE ORAL at 22:00

## 2021-11-19 RX ADMIN — ESCITALOPRAM OXALATE 5 MG: 10 TABLET ORAL at 14:59

## 2021-11-19 RX ADMIN — HYDROXYZINE HYDROCHLORIDE 50 MG: 50 TABLET, FILM COATED ORAL at 22:01

## 2021-11-19 RX ADMIN — PHENYTOIN 100 MG: 50 TABLET, CHEWABLE ORAL at 15:47

## 2021-11-19 ASSESSMENT — SLEEP AND FATIGUE QUESTIONNAIRES
RESTFUL SLEEP: NO
DIFFICULTY ARISING: NO
DO YOU USE A SLEEP AID: NO
DIFFICULTY FALLING ASLEEP: YES
DO YOU HAVE DIFFICULTY SLEEPING: YES
SLEEP PATTERN: DIFFICULTY FALLING ASLEEP;RESTLESSNESS;NIGHTMARES/TERRORS;INSOMNIA
DIFFICULTY STAYING ASLEEP: YES
AVERAGE NUMBER OF SLEEP HOURS: 4

## 2021-11-19 ASSESSMENT — LIFESTYLE VARIABLES: HISTORY_ALCOHOL_USE: NO

## 2021-11-19 ASSESSMENT — PATIENT HEALTH QUESTIONNAIRE - PHQ9: SUM OF ALL RESPONSES TO PHQ QUESTIONS 1-9: 9

## 2021-11-19 NOTE — PROGRESS NOTES
Patient transferred to [facility] via [facility], report called, all questions answered.

## 2021-11-19 NOTE — PLAN OF CARE
Problem: Falls - Risk of:  Goal: Will remain free from falls  Description: Will remain free from falls  Outcome: Completed  Goal: Absence of physical injury  Description: Absence of physical injury  Outcome: Completed     Problem:  Activity:  Goal: Risk for activity intolerance will decrease  Description: Risk for activity intolerance will decrease  Outcome: Completed     Problem: Coping:  Goal: Ability to adjust to condition or change in health will improve  Description: Ability to adjust to condition or change in health will improve  Outcome: Completed     Problem: Fluid Volume:  Goal: Ability to maintain a balanced intake and output will improve  Description: Ability to maintain a balanced intake and output will improve  Outcome: Completed

## 2021-11-19 NOTE — GROUP NOTE
Group Therapy Note    Date: 11/19/2021    Group Start Time: 1100  Group End Time: 1200  Group Topic: Recreational    ST[facility] ARVIN Alanis        Group Therapy Note    Attendees: 6/16         Patient's Goal:  Creative expression    Status After Intervention:  Improved    Participation Level:  Active Listener and Interactive    Participation Quality: Appropriate, Attentive, Sharing and Supportive      Speech:  normal      Thought Process/Content: Logical      Affective Functioning: Congruent      Mood: euphoric      Level of consciousness:  Alert, Oriented x4 and Attentive      Response to Learning: Able to verbalize current knowledge/experience, Able to verbalize/acknowledge new learning, Able to retain information and Capable of insight    Modes of Intervention: Support, Socialization, Exploration, Clarifying, Problem-solving and Activity      Discipline Responsible: Psychoeducational Specialist      Signature:  David Taylor

## 2021-11-19 NOTE — PLAN OF CARE
[employer]  Initial Interdisciplinary Treatment Plan NO      Original treatment plan Date & Time: 11/19/21    Admission Type:  Admission Type: Voluntary    Reason for admission:   Reason for Admission: Intentional drug over dose    Estimated Length of Stay:  5-7days  Estimated Discharge Date: to be determined by physician    PATIENT STRENGTHS:  Patient Strengths:Strengths: Communication, Social Skills  Patient Strengths and Limitations:Limitations: Tendency to isolate self, Inappropriate/potentially harmful leisure interests  Addictive Behavior: Addictive Behavior  In the past 3 months, have you felt or has someone told you that you have a problem with:  : None  Do you have a history of Chemical Use?: No  Do you have a history of Alcohol Use?: No  Do you have a history of Street Drug Abuse?: Yes  Histroy of Prescripton Drug Abuse?: No  Medical Problems:  Past Medical History:   Diagnosis Date    Seizures ([facility]      Status EXAM:Status and Exam  Normal: No  Facial Expression: Worried  Affect: Appropriate  Level of Consciousness: Alert  Mood:Normal: No  Mood: Depressed, Anxious  Motor Activity:Normal: Yes  Interview Behavior: Cooperative  Preception: [place] to Person, [place] to Place, [place] to Time, [place] to Situation  Attention:Normal: Yes  Thought Content:Normal: Yes  Hallucinations: None  Delusions: No  Memory:Normal: No  Memory: Poor Recent, Poor Remote  Insight and Judgment: No  Insight and Judgment: Poor Judgment, Poor Insight  Present Suicidal Ideation: No  Present Homicidal Ideation: No    EDUCATION:   Learner Progress Toward Treatment Goals: reviewed group plans and strategies for care    Method:group therapy, medication compliance, individualized assessments and care planning    Outcome: needs reinforcement    PATIENT GOALS: to be discussed with patient within 72 hours    PLAN/TREATMENT RECOMMENDATIONS:     continue group therapy , medications compliance, goal setting, individualized assessments and care, continue to monitor pt on unit      SHORT-TERM GOALS:   Time frame for Short-Term Goals: 5-7 days    LONG-TERM GOALS:  Time frame for Long-Term Goals: 6 months  Members Present in Team Meeting: See Signature [address] Rose Choud[place]ury
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
## 2021-11-19 NOTE — GROUP NOTE
Group Therapy Note    Date: 11/19/2021    Group Start Time: 0900  Group End Time: 2357  Group Topic: Healthy Living/Wellness    CZ BHARMANDO Moore RN        Group Therapy Note    Attendees: 4         Patient's Goal:  To find friends not doing drugs. Notes:  Topic: Goal Setting Group    Status After Intervention:  Unchanged    Participation Level:  Active Listener and Interactive    Participation Quality: Appropriate, Attentive and Sharing      Speech:  normal      Thought Process/Content: Logical      Affective Functioning: Congruent      Mood: anxious      Level of consciousness:  Alert, Oriented x4 and Attentive      Response to Learning: Able to verbalize current knowledge/experience and Able to verbalize/acknowledge new learning      Endings: None Reported    Modes of Intervention: Support      Discipline Responsible: Registered Nurse      Signature:  Sharon Maynard RN
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
## 2021-11-19 NOTE — DISCHARGE SUMMARY
[Z87.898] 11/15/2021    Elevated troponin [R77.8] 11/15/2021    Elevated LFTs [R79.89] 11/15/2021    SYLVIA (acute kidney injury) ([facility] [address] [N17.9] 11/15/2021    Hyperglycemia [R73.9] 11/15/2021    Leukocytosis [D72.829] 11/15/2021         Hospital Course:     Brief History  As documented in the medical record:  \"Admitted through ER with following information:  Yumiko Rios a 50 y. o. male who presents with suspected drug overdose. Patient was found down, unresponsive by EMS and given 4 mg IN narcan en route. Patient awakened immediately and began screaming. Patient extremely combative. Unknown LKW, patient not able to verbalize if he has any pain or if he did any street drugs. Patient screaming and physically combative throughout his initial assessment. No overt signs of trauma on arrival. Per EMR, has a history of seizures and is supposed to be on dilantin 100 mg TID.     Patient was kept on soft restraints which have been removed now  His cardiac enzymes were elevated for which he has been evaluated by cardiology and has been started on heparin drip  His creatinine is slightly elevated1.31, no previous numbers to compare  His LFT are also elevated, no previous numbers to compare  Blood glucose 248, no previous numbers to compare  Currently patient is awake and alert, answering appropriately, states he took some drugs orally, states he has been using IV drugs but is evasive when answering if he took anything this time\"     The intitial plan included:  \"Narcan drip is being discontinued  Continue heparin drip and follow cardiology evaluations and plan  Check for ABG, urine drug screen, hepatitis panel and A1c  Start oral diet if he is able to take orally now  Seizure precautions  Monitor labs in morning\"     Echo:  Left ventricle is normal in size. Global left ventricular systolic function   is normal.   Calculated ejection fraction 65% by Heart Model. Left atrium is normal in size.    Right atrium is normal in size.   Normal right ventricular size and function. Aortic valve is trileaflet and opens adequately. Mild aortic insufficiency. Normal mitral valve structure and function. No mitral regurgitation. Mild tricuspid regurgitation. No pulmonary hypertension. Estimated right ventricular systolic pressure is   77MTJR. No significant pericardial effusion is seen.      The patient's condition was stabilized  Transfer to [facility] initiated    Discharge plan:     Detox  Psych evaluation  Suicide precautions  Seizure precautions  Blood Pressure - Monitor and control   Check bun and creatinine   Trend trops  Cardiology eval & f/u as scheduled   Rule out non-ST elevation MI  Trend LFTs  Glycemic contol - monitor and control blood sugars   Social service consult:  Drug Rehab program   DVT prophylaxis  The patient's condition is stable   Will discharge to [facility] when arrangements complete and ok with other services. Follow-up with PCP in one week,   Notify PCP of discharge   DR Reggie Wilson MD  Ortho follow-up [facility] for TKA    No discharge procedures on file. Significant Diagnostic Studies:     Labs / Micro:  Labs:  Hematology:No results for input(s): WBC, RBC, HGB, HCT, MCV, MCH, MCHC, RDW, PLT, MPV, SEDRATE, CRP, INR, DDIMER, [id number], LABABSO in the last 72 hours.     Invalid input(s): PT  Chemistry:  Recent Labs     11/16/21  1846 11/17/21  0146 11/17/21  0535 11/17/21  1018   NA  --   --  135  --    K  --   --  3.9  --    CL  --   --  102  --    CO2  --   --  25  --    GLUCOSE  --   --  116*  --    BUN  --   --  10  --    CREATININE  --   --  0.76  --    ANIONGAP  --   --  8*  --    LABGLOM  --   --  >60  --    GFRAA  --   --  >60  --    CALCIUM  --   --  8.8  --    TROPHS 14 13  --  10     Recent Labs     11/16/21  1846 11/16/21  2123 11/17/21  0535 11/17/21  0829 11/17/21  1605 11/17/21  2108 11/18/21  0846 11/18/21  1252 11/18/21  1748 11/18/21  1929   PROT  --   --  6.4  --   --   --   --   --   --   -- LABALBU  --   --  3.5  --   --   --   --   --   --   --    AST  --   --  32  --   --   --   --   --   --   --    ALT  --   --  49*  --   --   --   --   --   --   --    ALKPHOS  --   --  75  --   --   --   --   --   --   --    BILITOT  --   --  0.29*  --   --   --   --   --   --   --    BILIDIR  --   --  0.09  --   --   --   --   --   --   --    PHENYF 0.8*  --   --   --   --   --   --   --   --   --    POCGLU  --    < >  --    < > 139* 134* 116* 176* 119* 145*    < > = values in this interval not displayed. Radiology:    ECHO Complete 2D W Doppler W Color    Result Date: 11/17/2021  Transthoracic Echocardiography Report (TTE)  Patient Name Dewayne Soto       Date of Study               11/16/2021               [address]   Date of      1965  Gender                      Male  Birth   Age          50 year(s)  Race                        Black   Room Number  6537        Height:                     70 inch, 177.8 cm   Corporate ID [id number]    Weight:                     185 pounds, 83.9 kg  #   Patient Acct [de-identified]   BSA:          2.02 m^2      BMI:     26.54 kg/m^2  #   MR #         [id number]     Sonographer                 Yenny Murdock   Accession #  [id number]  Interpreting Physician      Lisa Wood [place]   Fellow                   Referring Nurse                           Practitioner   Interpreting             Referring Physician         [address]  Fellow  Type of Study   TTE procedure:2D Echocardiogram, M-Mode, Doppler, Color Doppler. Procedure Date Date: 11/16/2021 Start: 02:03 PM Study Location: [facility] Technical Quality: Adequate visualization Indications:LV Function and Elevated troponin. History / Tech. Comments: Procedure explained to patient. Patient Status: Inpatient Height: 70 inches Weight: 185 pounds BSA: 2.02 m^2 BMI: 26.54 kg/m^2 HR: 78 bpm CONCLUSIONS Summary Left ventricle is normal in size.  Global left ventricular systolic function is normal. Calculated ejection fraction 65% by Heart Model. Left atrium is normal in size. Right atrium is normal in size. Normal right ventricular size and function. Aortic valve is trileaflet and opens adequately. Mild aortic insufficiency. Normal mitral valve structure and function. No mitral regurgitation. Mild tricuspid regurgitation. No pulmonary hypertension. Estimated right ventricular systolic pressure is 91QGWM. No significant pericardial effusion is seen. Signature ----------------------------------------------------------------------------  Electronically signed by Danelle Huffman(Sonographer) on 11/16/2021  02:35 PM ---------------------------------------------------------------------------- ----------------------------------------------------------------------------  Electronically signed by Antwan Bustamante(Interpreting physician) on 11/17/2021  09:21 AM ---------------------------------------------------------------------------- FINDINGS Left Atrium Left atrium is normal in size. Left Ventricle Left ventricle is normal in size. Global left ventricular systolic function is normal. Calculated ejection fraction 65% by Heart Model. Right Atrium Right atrium is normal in size. Right Ventricle Normal right ventricular size and function. TAPSE value of 2.96cm noted. Mitral Valve Normal mitral valve structure and function. No mitral regurgitation. Aortic Valve Aortic valve is trileaflet and opens adequately. Mild aortic insufficiency. Tricuspid Valve No obvious valvular abnormality. Mild tricuspid regurgitation. No pulmonary hypertension. Estimated right ventricular systolic pressure is 60HYFW. Pulmonic Valve The pulmonic valve is normal in structure. Mild pulmonic insufficiency. Pericardial Effusion No significant pericardial effusion is seen. Miscellaneous E/E' average = 8.5. IVC normal diameter & inspiratory collapse indicating normal RA filling pressure .  M-mode / 2D Measurements & Calculations:   LVIDd:4.5 cm(3.7 - 5.6 cm)       Diastolic Volume:147 ml  LVIDs:2.7 cm(2.2 - 4.0 cm)       Systolic BSQNDB:20 ml  IVSd:1 cm(0.6 - 1.1 cm)          Aortic Root:2.7 cm(2.0 - 3.7 cm)  LVPWd:1 cm(0.6 - 1.1 cm)         LA Dimension: 2.8 cm(1.9 - 4.0 cm)  Fractional Shortenin %       LA volume/Index: 34.9 ml /17m^2  Calculated LVEF (%): 65.31 %     LVOT:2.1 cm                                   RVDd:3.1 cm   Mitral:                                 Aortic   Valve Area (P1/2-Time): 4.58 cm^2       Peak Velocity: 1.51 m/s  Peak E-Wave: 0.71 m/s                   Mean Velocity: 0.92 m/s  Peak A-Wave: 0.58 m/s                   Peak Gradient: 9.12 mmHg  E/A Ratio: 1.21                         Mean Gradient: 4 mmHg  Peak Gradient: 2 mmHg  Mean Gradient: 2 mmHg  Deceleration Time: 165 msec             Area (continuity): 2.24 cm^2  P1/2t: 48 msec                          AV VTI: 25.4 cm   Area (continuity): 1.83 cm^2  Mean Velocity: 0.59 m/s   Tricuspid:                              Pulmonic:   Peak TR Velocity: 2.48 m/s              Peak Velocity: 1.09 m/s  Peak TR Gradient: 24.6016 mmHg          Peak Gradient: 4.75 mmHg  Diastology / Tissue Doppler Septal Wall E' velocity:0.07 m/s Septal Wall E/E':9.7 Lateral Wall E' velocity:0.10 m/s Lateral Wall E/E':7.2    CT Head WO Contrast    Result Date: 11/15/2021  EXAMINATION: CT OF THE HEAD WITHOUT CONTRAST  11/15/2021 4:13 am TECHNIQUE: CT of the head was performed without the administration of intravenous contrast. Dose modulation, iterative reconstruction, and/or weight based adjustment of the mA/kV was utilized to reduce the radiation dose to as low as reasonably achievable. COMPARISON: 2006.  HISTORY: ORDERING SYSTEM PROVIDED HISTORY: overdose, unknown if fall TECHNOLOGIST PROVIDED HISTORY: overdose, unknown if fall Decision Support Exception - unselect if not a suspected or confirmed emergency medical condition->Emergency Medical Condition (MA) Reason for Exam: drug overdose FINDINGS: BRAIN/VENTRICLES: There is no acute intracranial hemorrhage, mass effect or midline shift. No abnormal extra-axial fluid collection. There is no acute infarct. 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 The appearance is unchanged from the 2006 exam.  There is no evidence of hydrocephalus. ORBITS: The visualized portion of the orbits demonstrate no acute abnormality. SINUSES: The visualized paranasal sinuses and mastoid air cells demonstrate no acute abnormality. SOFT TISSUES/SKULL:  No acute abnormality of the visualized skull or soft tissues. No acute intracranial abnormality. XR CHEST PORTABLE    Result Date: 11/15/2021  EXAMINATION: ONE XRAY VIEW OF THE CHEST 11/15/2021 4:35 am COMPARISON: 07/07/2006 HISTORY: ORDERING SYSTEM PROVIDED HISTORY: overdose, unknown if fall, r/o fracture, r/o pneumo TECHNOLOGIST PROVIDED HISTORY: overdose, unknown if fall, r/o fracture, r/o pneumo Reason for Exam: supine port, Overdose FINDINGS: Mild cardiomegaly increased from previous. Pulmonary vasculature appears mildly congested. The lungs are otherwise clear low lung volumes. Pneumothorax and pleural effusion are not excluded on this supine image. Surrounding structures are unremarkable. Findings suggestive of mild congestive failure. Otherwise negative exam. Pneumothorax is not excluded on this supine image. Consultations:    Consults:     Final Specialist Recommendations/Findings:   IP CONSULT TO CARDIOLOGY  IP CONSULT TO HOSPITALIST  IP CONSULT TO PSYCHIATRY  CHEMICAL DEPENDENCY REFERRAL FOR SOCIAL SERVICE CONSULT        Discharged Condition:    Stable     Disposition: [facility]    Physician Follow Up:   No follow-up provider specified.      Activity:  activity as tolerated    Diet:  Low Na, low carb     Discharge Medications:      Medication List      ASK your doctor about these medications    phenytoin 50 MG tablet  Commonly known as: DILANTIN Time Spent on discharge is  15 mins in patient examination, evaluation, counseling, medication reconciliation, discharge plan and follow up.     Electronically signed by   Nelda Pinto DO  11/19/2021  7:13 AM

## 2021-11-19 NOTE — PROGRESS NOTES
Pharmacy Medication History Note      List of current medications patient is taking is complete. Source of information: Patient, [address] ([place]), [address] made to medication list:  Medications removed (include reason, ex. therapy complete or physician discontinued, noncompliance):  none    Medications added/doses adjusted:  none    Other notes (ex. Recent course of antibiotics, Coumadin dosing): I spoke with patient who reports he takes Phenytoin but was not sure of dosing. He states he uses [address] ([place]) and [address]. No fill history found at either pharmacy within the last year. Patient did receive Phenytoin 100 mg three times daily while admitted to [facility]. Please let me know if you have any questions about this encounter. Thank you!     Electronically signed by Audra King, [facility] on 11/19/2021 at 9:33 AM

## 2021-11-19 NOTE — GROUP NOTE
Group Therapy Note    Date: 11/19/2021    Group Start Time: 1000  Group End Time: 1100  Group Topic: Psychotherapy    [facility]    REFUGIO Leach, LSW        Group Therapy Note    Attendees: 2/13         Patient's Goal:  To engage in psychotherapy in the prevention of future episodes by delivering behavioral training to improve illness insight, early symptom identification and development of coping strategies. Notes:  Group discussion was focused on understanding of current emotions of grief. Status After Intervention:  Improved    Participation Level:  Active Listener and Interactive    Participation Quality: Appropriate and Attentive      Speech:  Normal      Thought Process/Content: Linear      Affective Functioning: Congruent      Mood: euthymic      Level of consciousness:  Alert, Oriented x4 and Attentive      Response to Learning: Able to verbalize current knowledge/experience and Able to verbalize/acknowledge new learning      Endings: None Reported    Modes of Intervention: Support, Socialization, Exploration and Clarifying      Discipline Responsible: /Counselor      Signature:  REFUGIO Leach, [employer]

## 2021-11-19 NOTE — BH NOTE
During admission patient asked if he had any clothes or belongings. Patient stated while at [address] his clothes and sixto earrings were removed and that they were going to be washed. Patient arrived to unit with no personal belongings. Writer called [facility] and staff reported there was nothing left in patients room. Writer then called the [facility] , who reported that the patient's belongings were not there, and that [facility] police had been contacted and they did not have the patient's belongings. [facility] charge nurse notified.

## 2021-11-19 NOTE — PLAN OF CARE
Problem: Falls - Risk of:  Goal: Will remain free from falls  Description: Will remain free from falls  Outcome: Ongoing  Pt did not have any falls this shift. Goal: Absence of physical injury  Description: Absence of physical injury  Outcome: Ongoing  Pt did not have any physical injuries this shift. Problem: Altered Mood, Depressive Behavior:  Goal: Able to verbalize and/or display a decrease in depressive symptoms  Description: Able to verbalize and/or display a decrease in depressive symptoms  Outcome: Ongoing  Pt states that he is feeling motivated to change his life. He said that he plans on getting a group of friends that do not do drugs when he leaves. He was social with select peers and out in the milieu. Problem: Depressive Behavior With or Without Suicide Precautions:  Goal: Ability to disclose and discuss suicidal ideas will improve  Description: Ability to disclose and discuss suicidal ideas will improve  Outcome: Ongoing  Pt denied current suicidal ideations.

## 2021-11-19 NOTE — BH NOTE
[address] [facility]  Admission Note     Admission Type:   Admission Type: Voluntary    Reason for admission:  Reason for Admission: Intentional drug over dose    PATIENT STRENGTHS:  Strengths: Communication, Social Skills    Patient Strengths and Limitations:  Limitations: Tendency to isolate self, Inappropriate/potentially harmful leisure interests    Addictive Behavior:   Addictive Behavior  In the past 3 months, have you felt or has someone told you that you have a problem with:  : None  Do you have a history of Chemical Use?: No  Do you have a history of Alcohol Use?: No  Do you have a history of Street Drug Abuse?: Yes  Histroy of Prescripton Drug Abuse?: No    Medical Problems:   Past Medical History:   Diagnosis Date    Seizures ([facility] [address]        Status EXAM:  Status and Exam  Normal: No  Facial Expression: Worried  Affect: Appropriate  Level of Consciousness: Alert  Mood:Normal: No  Mood: Depressed, Anxious  Motor Activity:Normal: Yes  Interview Behavior: Cooperative  Preception: [place] to Person, [place] to Place, [place] to Time, [place] to Situation  Attention:Normal: Yes  Thought Content:Normal: Yes  Hallucinations: None  Delusions: No  Memory:Normal: No  Memory: Poor Recent, Poor Remote  Insight and Judgment: No  Insight and Judgment: Poor Judgment, Poor Insight  Present Suicidal Ideation: No  Present Homicidal Ideation: No    Tobacco Screening:  Practical Counseling, on admission, roberto X, if applicable and completed (first 3 are required if patient doesn't refuse):            ( )  Recognizing danger situations (included triggers and roadblocks)                    ( )  Coping skills (new ways to manage stress, exercise, relaxation techniques, changing routine, distraction)                                                           ( )  Basic information about quitting (benefits of quitting, techniques in how to quit, available resources  ( ) Referral for counseling faxed to [facility] ( ) Patient refused counseling  ( ) Patient has not smoked in the last 30 days    Metabolic Screening:    Lab Results   Component Value Date    LABA1C 5.4 11/15/2021       No results found for: CHOL  No results found for: TRIG  No results found for: HDL  No components found for: LDLCAL  No results found for: LABVLDL      Body mass index is 23.63 kg/m². BP Readings from Last 2 Encounters:   11/18/21 119/76   11/18/21 116/80           Pt admitted with followings belongings:  Dentures: None  Vision - Corrective Lenses: None  Hearing Aid: None  Jewelry: None  Body Piercings Removed: N/A  Clothing: None  Were All Patient Medications Collected?: Not Applicable  Other Valuables: None     Patient's home medications were verified. Patient oriented to surroundings and program expectations and copy of patient rights given. Received admission packet:  Yes. Consents reviewed, signed Yes. Patient verbalize understanding:  Yes. Patient education on precautions: Yes. Patient did not have a cell phone in his possession on admission.                    Anna Miguel RN
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
## 2021-11-20 LAB — HEPATITIS C ANTIBODY: NONREACTIVE

## 2021-11-20 PROCEDURE — 6370000000 HC RX 637 (ALT 250 FOR IP): Performed by: PSYCHIATRY & NEUROLOGY

## 2021-11-20 PROCEDURE — 99231 SBSQ HOSP IP/OBS SF/LOW 25: CPT | Performed by: INTERNAL MEDICINE

## 2021-11-20 PROCEDURE — 86803 HEPATITIS C AB TEST: CPT

## 2021-11-20 PROCEDURE — 1240000000 HC EMOTIONAL WELLNESS R&B

## 2021-11-20 PROCEDURE — 99232 SBSQ HOSP IP/OBS MODERATE 35: CPT

## 2021-11-20 PROCEDURE — 36415 COLL VENOUS BLD VENIPUNCTURE: CPT

## 2021-11-20 PROCEDURE — 6370000000 HC RX 637 (ALT 250 FOR IP)

## 2021-11-20 RX ADMIN — HYDROXYZINE HYDROCHLORIDE 50 MG: 50 TABLET, FILM COATED ORAL at 21:00

## 2021-11-20 RX ADMIN — PHENYTOIN 100 MG: 50 TABLET, CHEWABLE ORAL at 08:18

## 2021-11-20 RX ADMIN — PHENYTOIN 100 MG: 50 TABLET, CHEWABLE ORAL at 15:10

## 2021-11-20 RX ADMIN — PHENYTOIN 100 MG: 50 TABLET, CHEWABLE ORAL at 21:00

## 2021-11-20 RX ADMIN — TRAZODONE HYDROCHLORIDE 50 MG: 50 TABLET ORAL at 21:00

## 2021-11-20 RX ADMIN — ESCITALOPRAM OXALATE 5 MG: 10 TABLET ORAL at 08:18

## 2021-11-20 ASSESSMENT — PAIN SCALES - GENERAL: PAINLEVEL_OUTOF10: 0

## 2021-11-20 NOTE — PLAN OF CARE
[address] [facility]  Day 3 Interdisciplinary Treatment Plan NOTE    Review Date & Time: 11/20/2021 1131    Admission Type:   Admission Type: Voluntary    Reason for admission:  Reason for Admission: Intentional drug over dose  Estimated Length of Stay: 5-7 days  Estimated Discharge Date Update: to be determined by physician    PATIENT STRENGTHS:  Patient Strengths Strengths: Communication, Social Skills  Patient Strengths and Limitations:Limitations: Tendency to isolate self, Inappropriate/potentially harmful leisure interests  Addictive Behavior:Addictive Behavior  In the past 3 months, have you felt or has someone told you that you have a problem with:  : None  Do you have a history of Chemical Use?: No  Do you have a history of Alcohol Use?: No  Do you have a history of Street Drug Abuse?: Yes  Histroy of Prescripton Drug Abuse?: No  Medical Problems:  Past Medical History:   Diagnosis Date    Seizures ([facility]        Risk:  Fall RiskTotal: 75  Ton Scale Ton Scale Score: 23  BVC    Change in scores no Changes to plan of Care no    Status EXAM:   Status and Exam  Normal: No  Facial Expression: Flat  Affect: Appropriate  Level of Consciousness: Alert  Mood:Normal: No  Mood: Anxious  Motor Activity:Normal: Yes  Interview Behavior: Cooperative  Preception: Enloe to Person, Enloe to Time, Enloe to Place, Enloe to Situation  Attention:Normal: Yes  Thought Processes: Blocking  Thought Content:Normal: Yes  Hallucinations: None  Delusions: No  Memory:Normal: No  Memory: Poor Recent, Poor Remote  Insight and Judgment: No  Insight and Judgment: Poor Judgment, Poor Insight  Present Suicidal Ideation: No  Present Homicidal Ideation: No    Daily Assessment Last Entry:             Patient Currently in Pain: No  Daily Nutrition (WDL): Within Defined Limits    Patient Monitoring:  Frequency of Checks: 4 times per hour, close    Psychiatric Symptoms:   Depression Symptoms  Depression Symptoms: Isolative, Loss of

## 2021-11-20 NOTE — GROUP NOTE
Group Therapy Note    Date: 11/19/2021    Group Start Time: 2000  Group End Time: 2034  Group Topic: Wrap-Up    KWAKU ALEBRT    [employer]        Group Therapy Note    Attendees: 8/16           Status After Intervention:  Improved    Participation Level:  Active Listener    Participation Quality: Appropriate      Speech:  normal      Thought Process/Content: Logical      Affective Functioning: Congruent      Mood: elevated      Level of consciousness:  Alert      Response to Learning: Able to verbalize current knowledge/experience      Endings: None Reported    Modes of Intervention: Support      Discipline Responsible: Behavorial Health Tech      Signature:  [employer]
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
## 2021-11-20 NOTE — GROUP NOTE
Group Therapy Note    Date: 11/20/2021    Group Start Time: 1400  Group End Time: 1450  Group Topic: Psychoeducation    KWAKU ALBERT    Indu Saxena        Group Therapy Note    Attendees: 11/17         Patient's Goal:  To improve patient relaxation and knowledge of healthy coping skills     Notes:  Patient was pleasant and appropriate throughout the session     Status After Intervention:  Improved    Participation Level:  Active Listener and Interactive    Participation Quality: Appropriate, Attentive, Sharing and Supportive      Speech:  normal      Thought Process/Content: Logical      Affective Functioning: Congruent      Mood: euthymic      Level of consciousness:  Alert, Oriented x4 and Attentive      Response to Learning: Able to verbalize current knowledge/experience, Able to verbalize/acknowledge new learning, Capable of insight and Progressing to goal      Endings: None Reported    Modes of Intervention: Education, Support, Socialization, Exploration, Clarifying and Problem-solving      Discipline Responsible: Psychoeducational Specialist      Signature:  Evelin Wellington

## 2021-11-20 NOTE — GROUP NOTE
Group Therapy Note    Date: 11/20/2021    Group Start Time: 0900  Group End Time: 0930  Group Topic: Community Meeting    ARVIN Reyes    Group Therapy Note    Attendees: 7    Patient's Goal:  Pt will identify daily goal and demonstrate understanding of unit guidelines and schedule    Notes:  Pt attended group and participated    Status After Intervention:  Improved    Participation Level:  Active Listener and Interactive    Participation Quality: Appropriate, Attentive and Sharing      Speech:  normal      Thought Process/Content: Logical  Linear      Affective Functioning: Constricted/Restricted      Mood: euthymic      Level of consciousness:  Alert, Oriented x4 and Attentive      Response to Learning: Able to verbalize current knowledge/experience, Able to verbalize/acknowledge new learning, Able to retain information, Capable of insight, Able to change behavior and Progressing to goal      Endings: None Reported    Modes of Intervention: Education, Support, Socialization, Exploration and Limit-setting      Discipline Responsible: Behavorial Health Tech      Signature:  Keiko Cooper, [address]

## 2021-11-20 NOTE — PROGRESS NOTES
Daily Progress Note  11/20/2021    Patient Name: Reynaldo Garzon    CHIEF COMPLAINT: Suicide attempt by overdose         SUBJECTIVE:      Patient is seen today for a follow up assessment. Patient is compliant with scheduled medications. Patient is not required emergency medications in the past 24 hours. Patient reports improvement in symptoms of depression. However he endorses anxiety today. He rates his anxiety as a six (0-10 scale with zero being none and 10 being the worst). He reports he slept well last night and that his appetite is adequate. Patient endorses fleeting suicidal ideation without current plan or intent. He denies homicidal ideation. He is able to contract for safety on this unit with this writer. He denies auditory and visual hallucinations. He denies paranoia. He reports that he still feels somewhat hopeless and helpless due to his situation but is hoping that he can get into a sober living facility. He reports that he still needs to get the list of places so that he can make calls. He reports that he is hoping to get into somewhere like the [facility]. He denies medication side effects or medical concerns at this time. Writer encouraged patient to attend groups on the unit. At this time, the patient is not appropriate for a lower level of care. There is risk of decompensation and patient warrants further hospitalization for safety and stabilization. Appetite:  [x] Normal/Adequate/Unchanged  [] Increased  [] Decreased      Sleep:       [x] Normal/Adequate/Unchanged  [] Fair  [] Poor      Group Attendance on Unit:   [x] Yes  [] Selectively    [] No    Medication Side Effects: Patient denies any medication side effects at the time of assessment. Mental Status Exam  Level of consciousness: Alert and awake. Appearance: Appropriate attire for setting, seated in chair, with poor grooming and hygiene. Behavior/Motor: Approachable, no psychomotor abnormalities. mL, 30 mL, Oral, Q6H PRN  hydrOXYzine (ATARAX) tablet 50 mg, 50 mg, Oral, TID PRN  ibuprofen (ADVIL;MOTRIN) tablet 400 mg, 400 mg, Oral, Q6H PRN  polyethylene glycol (GLYCOLAX) packet 17 g, 17 g, Oral, Daily PRN  traZODone (DESYREL) tablet 50 mg, 50 mg, Oral, Nightly PRN    ASSESSMENT  Major depressive disorder, recurrent, severe with psychotic features ([facility]         PLAN  Patient symptoms are: Modestly Improving. Continue current medication regimen. Monitor need and frequency of PRN medications. Encourage participation in groups and milieu. Attempt to develop insight. Psycho-education conducted. Supportive Therapy conducted. Internal Medicine Following  Probable discharge is to be determined by MD.   Follow-up daily while inpatient. Patient continues to be monitored in the inpatient psychiatric facility at [facility] for safety and stabilization. Patient continues to need, on a daily basis, active treatment furnished directly by or requiring the supervision of inpatient psychiatric personnel. Electronically signed by SPENCER Treadwell CNP on 11/20/2021 at 2:32 PM    **This report has been created using voice recognition software. It may contain minor errors which are inherent in voice recognition technology. **

## 2021-11-20 NOTE — PLAN OF CARE
Patient was not able to verbalize a decrease in depressive symptoms. Patient remains free from falls and physical injury.

## 2021-11-20 NOTE — GROUP NOTE
Group Therapy Note    Date: 11/20/2021    Group Start Time: 1003  Group End Time: 4286  Group Topic: Psychoeducation    KWAKU LEPE    REFUGIO Subramanian, RAMESH        Group Therapy Note    Attendees: 9/17         Patient's Goal:  Increase interpersonal relationship skills utilizing talk therapy      Status After Intervention:  Unchanged    Participation Level:  Active Listener and Interactive    Participation Quality: Appropriate      Speech:  normal      Thought Process/Content: Logical      Affective Functioning: Congruent      Mood: euthymic      Level of consciousness:  Attentive      Response to Learning: Able to verbalize current knowledge/experience      Endings: None Reported    Modes of Intervention: Education and Socialization      Discipline Responsible: /Counselor      Signature:  REFUGIO Subramanian, RAMESH

## 2021-11-20 NOTE — PLAN OF CARE
Pt did not have any falls this shift. He did not have any physical injuries. He was out in the milieu and social with peers. He was medication compliant.        Problem: Falls - Risk of:  Goal: Will remain free from falls  Description: Will remain free from falls  11/20/2021 1827 by Paris Sahni RN  Outcome: Ongoing  11/20/2021 1129 by Laymon Mention  Outcome: Ongoing  Goal: Absence of physical injury  Description: Absence of physical injury  11/20/2021 1827 by Paris Sahni RN  Outcome: Ongoing  11/20/2021 1129 by Laymon Mention  Outcome: Ongoing     Problem: Altered Mood, Depressive Behavior:  Goal: Able to verbalize and/or display a decrease in depressive symptoms  Description: Able to verbalize and/or display a decrease in depressive symptoms  11/20/2021 1827 by Paris Swann RN  Outcome: Ongoing  11/20/2021 1129 by Laymon Mention  Outcome: Ongoing     Problem: Depressive Behavior With or Without Suicide Precautions:  Goal: Ability to disclose and discuss suicidal ideas will improve  Description: Ability to disclose and discuss suicidal ideas will improve  11/20/2021 1827 by Paris Swann RN  Outcome: Ongoing  11/20/2021 1129 by Laymon Mention  Outcome: Ongoing     Problem: Falls - Risk of:  Goal: Will remain free from falls  Description: Will remain free from falls  11/20/2021 1827 by Paris Swann RN  Outcome: Ongoing  11/20/2021 1129 by Laymon Mention  Outcome: Ongoing  Goal: Absence of physical injury  Description: Absence of physical injury  11/20/2021 1827 by Paris Swann RN  Outcome: Ongoing  11/20/2021 1129 by Laymon Mention  Outcome: Ongoing     Problem: Altered Mood, Depressive Behavior:  Goal: Able to verbalize and/or display a decrease in depressive symptoms  Description: Able to verbalize and/or display a decrease in depressive symptoms  11/20/2021 1827 by Paris Swann RN  Outcome: Ongoing  11/20/2021 1129 by Laymon Mention  Outcome: Ongoing     Problem: Depressive Behavior With or Without Suicide Precautions:  Goal: Ability to disclose and discuss suicidal ideas will improve  Description: Ability to disclose and discuss suicidal ideas will improve  11/20/2021 1827 by April Caterina Onofre RN  Outcome: Ongoing  11/20/2021 1129 by Evelin Wellington  Outcome: Ongoing

## 2021-11-21 PROCEDURE — 1240000000 HC EMOTIONAL WELLNESS R&B

## 2021-11-21 PROCEDURE — 6370000000 HC RX 637 (ALT 250 FOR IP)

## 2021-11-21 PROCEDURE — 6370000000 HC RX 637 (ALT 250 FOR IP): Performed by: PSYCHIATRY & NEUROLOGY

## 2021-11-21 PROCEDURE — 99232 SBSQ HOSP IP/OBS MODERATE 35: CPT | Performed by: NURSE PRACTITIONER

## 2021-11-21 RX ORDER — ESCITALOPRAM OXALATE 10 MG/1
10 TABLET ORAL DAILY
Status: DISCONTINUED | OUTPATIENT
Start: 2021-11-22 | End: 2021-11-26 | Stop reason: HOSPADM

## 2021-11-21 RX ADMIN — HYDROXYZINE HYDROCHLORIDE 50 MG: 50 TABLET, FILM COATED ORAL at 23:00

## 2021-11-21 RX ADMIN — PHENYTOIN 100 MG: 50 TABLET, CHEWABLE ORAL at 23:00

## 2021-11-21 RX ADMIN — TRAZODONE HYDROCHLORIDE 50 MG: 50 TABLET ORAL at 23:00

## 2021-11-21 RX ADMIN — PHENYTOIN 100 MG: 50 TABLET, CHEWABLE ORAL at 13:54

## 2021-11-21 RX ADMIN — PHENYTOIN 100 MG: 50 TABLET, CHEWABLE ORAL at 08:17

## 2021-11-21 RX ADMIN — ESCITALOPRAM OXALATE 5 MG: 10 TABLET ORAL at 08:17

## 2021-11-21 ASSESSMENT — PAIN SCALES - GENERAL
PAINLEVEL_OUTOF10: 0
PAINLEVEL_OUTOF10: 0

## 2021-11-21 NOTE — GROUP NOTE
Group Therapy Note    Date: 11/21/2021    Group Start Time: 1400  Group End Time: 6468  Group Topic: Psychoeducation    BETH OsorioS    Group Therapy Note    Attendees: 13    Patient's Goal:  Pt will identify improved benefits of music for coping. Notes:  Pt attended group and participated    Status After Intervention:  Improved    Participation Level:  Active Listener and Interactive    Participation Quality: Appropriate, Attentive, Sharing and Supportive      Speech:  normal      Thought Process/Content: Logical  Linear      Affective Functioning: Congruent      Mood: euthymic      Level of consciousness:  Alert, Oriented x4 and Attentive      Response to Learning: Able to verbalize current knowledge/experience, Able to verbalize/acknowledge new learning, Able to retain information, Capable of insight, Able to change behavior and Progressing to goal      Endings: None Reported    Modes of Intervention: Education, Support, Socialization and Exploration      Discipline Responsible: Behavorial Health Tech      Signature:  Juan M Noel, [address]

## 2021-11-21 NOTE — GROUP NOTE
Group Therapy Note    Date: 11/21/2021    Group Start Time: 0900  Group End Time: 0930  Group Topic: Community Meeting    KWAKU Shoemaker, CTRS    Group Therapy Note    Attendees: 8    Patient's Goal:  Pt will identify daily goal and demonstrate understanding of unit schedule and guidelines. Notes:  Patient attended group and participated. Status After Intervention:  Improved    Participation Level:  Active Listener and Interactive    Participation Quality: Appropriate, Attentive, Sharing and Supportive      Speech:  normal      Thought Process/Content: Logical  Linear      Affective Functioning: Congruent      Mood: euthymic      Level of consciousness:  Alert, Oriented x4 and Attentive      Response to Learning: Able to verbalize current knowledge/experience, Able to verbalize/acknowledge new learning, Able to retain information, Capable of insight, Able to change behavior and Progressing to goal      Endings: None Reported    Modes of Intervention: Education, Support, Socialization, Exploration and Limit-setting      Discipline Responsible: Behavorial Health Tech      Signature:  Nirmal Shoemaker, [address]

## 2021-11-21 NOTE — PLAN OF CARE
Problem: Altered Mood, Depressive Behavior:  Goal: Able to verbalize and/or display a decrease in depressive symptoms  Description: Able to verbalize and/or display a decrease in depressive symptoms  11/21/2021 1017 by BETH GregorioS  Outcome: Ongoing  Note: Patient admits to anxiety at this time. Patient denies depression, hallucinations, suicidal and homicidal ideation. Patient states he has good appetite and slept well. Patient denies pain and states he feels improved mood. Patient is attending unit programming and social with peers. Patient is medication compliant. Problem: Depressive Behavior With or Without Suicide Precautions:  Goal: Ability to disclose and discuss suicidal ideas will improve  Description: Ability to disclose and discuss suicidal ideas will improve  11/21/2021 1017 by BETH GregorioS  Outcome: Ongoing  Note: Patient denies suicidal ideation at this time. Patient agrees to seek out staff if thoughts to harm self should occur. Q15 checks are maintained for safety.

## 2021-11-21 NOTE — PROGRESS NOTES
Daily Progress Note  11/21/2021    Patient Name: Ioana Jauregui    CHIEF COMPLAINT: Suicide attempt by overdose         SUBJECTIVE:      Patient is seen today for a follow up assessment. Patient is compliant with scheduled medications. Patient has not required emergency medications in the past 24 hours. Patient reports improvement in symptoms of depression reporting that this is the \" best I've felt in a long time\". He reports that he is ready to take his sobriety seriously. Reports improvement in anxiety and depression at this time. He reports he slept well last night and that his appetite is adequate. Patient reports improvement in suicidal ideation. He denies homicidal ideation. He denies auditory and visual hallucinations. He denies paranoia. He reports that he still feels hopeless and helpless due to his housing situation, patient is hopeful that he can get into a sober living facility. Patient states that he needs \" needs to do something different, and using drugs is not worth it anymore\". He reports that he has used crack \"for a really long time\" and does not feel that he should have had that type of response from \"a $10 rock\". He acknowledges that he cannot trust the dealers and reports that he had to talk his family down from retaliatory statements\". Patient was given the green folder, was encouraged to start calling and looking for sober living facility. He reports that he is hoping to get into somewhere like the [facility] or other sober living facilities. He denies medication side effects or medical concerns at this time. If he were to discharge back to the street he is unable to contract for safety and is at high risk for decompensation.     Appetite:  [x] Normal/Adequate/Unchanged  [] Increased  [] Decreased      Sleep:       [x] Normal/Adequate/Unchanged  [] Fair  [] Poor      Group Attendance on Unit:   [x] Yes  [] Selectively    [] No    Medication Side Effects: Patient denies any medication side effects at the time of assessment. Mental Status Exam  Level of consciousness: Alert and awake. Appearance: Appropriate attire for setting, seated in chair, with fair grooming and hygiene. Behavior/Motor: Approachable, no psychomotor abnormalities. Attitude toward examiner: Cooperative, attentive, good eye contact. Speech: Normal rate, normal volume, normal tone. Mood:  Patient reports \"feeling better than I have in a long time\". Affect: brightened   Thought processes: Linear and coherent. Goal directed. Thought content: Denies homicidal ideation. Suicidal Ideation: Endorses improvement in, continues to contract for safety  Delusions: No evidence of delusions. Denies paranoia. Perceptual Disturbance: Patient does not appear to be responding to internal stimuli. Denies auditory hallucinations. Denies visual hallucinations. Cognition: Oriented to self, location, time, and situation. Memory: Intact. Insight & Judgement: Poor. Data   height is 5' 9\" (1.753 m) and weight is 160 lb (72.6 kg). His temporal temperature is 97.1 °F (36.2 °C). His blood pressure is 120/56 (abnormal) and his pulse is 87. His respiration is 14. Labs:   Admission on 11/18/2021   Component Date Value Ref Range Status    Hepatitis C Ab 11/20/2021 NONREACTIVE  NONREACTIVE Final    Comment:       The hepatitis C procedure used in our laboratory is a Chemiluminescent test specific for   three recombinant HCV antigens. A negative anti-HCV result indicates that the antibodies to   hepatitis C virus are not present at this time. Individuals with reactive anti-HCV should be considered infected and infectious until proven   otherwise. Confirmation of all equivocal or reactive results is recommended by ordering   HCV RNA by PCR. Reviewed patient's current plan of care and vital signs with nursing staff.     Labs reviewed: [x] Yes  Last EKG in EMR reviewed: [x] Yes  QTc: 476    Medications  Current Facility-Administered Medications: escitalopram (LEXAPRO) tablet 5 mg, 5 mg, Oral, Daily  phenytoin (DILANTIN) chewable tablet 100 mg, 100 mg, Oral, TID  acetaminophen (TYLENOL) tablet 650 mg, 650 mg, Oral, Q4H PRN  aluminum & magnesium hydroxide-simethicone (MAALOX) 200-200-20 MG/5ML suspension 30 mL, 30 mL, Oral, Q6H PRN  hydrOXYzine (ATARAX) tablet 50 mg, 50 mg, Oral, TID PRN  ibuprofen (ADVIL;MOTRIN) tablet 400 mg, 400 mg, Oral, Q6H PRN  polyethylene glycol (GLYCOLAX) packet 17 g, 17 g, Oral, Daily PRN  traZODone (DESYREL) tablet 50 mg, 50 mg, Oral, Nightly PRN    ASSESSMENT  Major depressive disorder, recurrent, severe with psychotic features ([facility] [address]         PLAN  Patient symptoms are: Improving   Increase escitalopram to 10mg Daily  Monitor need and frequency of PRN medications. Encourage participation in groups and milieu. Attempt to develop insight. Psycho-education conducted. Supportive Therapy conducted. Internal Medicine signed off  Probable discharge is to be determined by MD, has been provided a green folder with intent to start calling inpatient treatment facilities. Follow-up daily while inpatient. Patient continues to be monitored in the inpatient psychiatric facility at [facility] for safety and stabilization. Patient continues to need, on a daily basis, active treatment furnished directly by or requiring the supervision of inpatient psychiatric personnel. Electronically signed by SPENCER Mueller CNP on 11/21/2021 at 3:26 PM    **This report has been created using voice recognition software. It may contain minor errors which are inherent in voice recognition technology. **

## 2021-11-22 PROCEDURE — 1240000000 HC EMOTIONAL WELLNESS R&B

## 2021-11-22 PROCEDURE — 90833 PSYTX W PT W E/M 30 MIN: CPT | Performed by: PSYCHIATRY & NEUROLOGY

## 2021-11-22 PROCEDURE — 6370000000 HC RX 637 (ALT 250 FOR IP): Performed by: NURSE PRACTITIONER

## 2021-11-22 PROCEDURE — 6370000000 HC RX 637 (ALT 250 FOR IP): Performed by: PSYCHIATRY & NEUROLOGY

## 2021-11-22 PROCEDURE — 99232 SBSQ HOSP IP/OBS MODERATE 35: CPT | Performed by: PSYCHIATRY & NEUROLOGY

## 2021-11-22 RX ADMIN — ACETAMINOPHEN 650 MG: 325 TABLET ORAL at 14:17

## 2021-11-22 RX ADMIN — ESCITALOPRAM OXALATE 10 MG: 10 TABLET ORAL at 08:16

## 2021-11-22 RX ADMIN — PHENYTOIN 100 MG: 50 TABLET, CHEWABLE ORAL at 22:12

## 2021-11-22 RX ADMIN — PHENYTOIN 100 MG: 50 TABLET, CHEWABLE ORAL at 14:17

## 2021-11-22 RX ADMIN — HYDROXYZINE HYDROCHLORIDE 50 MG: 50 TABLET, FILM COATED ORAL at 22:12

## 2021-11-22 RX ADMIN — PHENYTOIN 100 MG: 50 TABLET, CHEWABLE ORAL at 09:09

## 2021-11-22 ASSESSMENT — PAIN SCALES - GENERAL
PAINLEVEL_OUTOF10: 3
PAINLEVEL_OUTOF10: 0

## 2021-11-22 NOTE — GROUP NOTE
Group Therapy Note    Date: 11/22/2021    Group Start Time: 1330  Group End Time: 0698  Group Topic: Recreational    [facility]        Group Therapy Note    Attendees: 9/20         Patient's Goal:  Leisure awareness    Status After Intervention:  Improved    Participation Level:  Active Listener and Interactive    Participation Quality: Appropriate, Attentive, Sharing and Supportive      Speech:  normal      Thought Process/Content: Logical      Affective Functioning: Congruent      Mood: euphoric      Level of consciousness:  Alert, Oriented x4 and Attentive      Response to Learning: Able to verbalize current knowledge/experience, Able to verbalize/acknowledge new learning and Able to retain information    Modes of Intervention: Education, Support, Socialization, Exploration, Clarifying and Problem-solving      Discipline Responsible: Psychoeducational Specialist      Signature:  Carolyn Noble

## 2021-11-22 NOTE — PROGRESS NOTES
Daily Progress Note  11/22/2021    Patient Name: Betina Weinberg    CHIEF COMPLAINT: Suicide attempt by overdose         SUBJECTIVE:      Patient is seen today for a follow up assessment. He reports that he plans to reach out to [facility], he has not initiated that call at this time. We did discuss the importance of being involved and proactive in placement upon discharge. He reports that he will contact his [facility] after group today. He was actively involved in group prior to our interview. He reports that he is tolerating the increase in Lexapro, he denies any side effects or GI upset. He reports that he is feeling much safer in this environment and that his mood continues to improve. Without appropriate placement to recovery housing he is at high risk for relapse and decompensation. He does continue to contract for safety on the inpatient unit and it was reinforced that he needs be an active participant in discharge planning. Appetite:  [x] Normal/Adequate/Unchanged  [] Increased  [] Decreased      Sleep:       [x] Normal/Adequate/Unchanged  [] Fair  [] Poor      Group Attendance on Unit:   [x] Yes  [] Selectively    [] No    Medication Side Effects: Patient denies any medication side effects at the time of assessment. Mental Status Exam  Level of consciousness: Alert and awake. Appearance: Appropriate attire for setting, seated in chair, with fair grooming and hygiene. Behavior/Motor: Approachable, engages in interview  Attitude toward examiner: Cooperative, attentive, good eye contact. Speech: Normal rate, normal volume, normal tone. Mood: \"Better\"  Affect: Congruent  Thought processes: Linear and coherent. Goal directed. Thought content: Denies homicidal ideation. Suicidal Ideation: Endorses improvement in, continues to contract for safety  Delusions: No evidence of delusions. Denies paranoia.   Perceptual Disturbance: Patient does not appear to be responding to internal stimuli. Denies auditory hallucinations. Denies visual hallucinations. Cognition: Oriented to self, location, time, and situation. Memory: Intact. Insight & Judgement: Poor. Data   height is 5' 9\" (1.753 m) and weight is 160 lb (72.6 kg). His oral temperature is 97.3 °F (36.3 °C). His blood pressure is 108/78 and his pulse is 87. His respiration is 14. Labs:   Admission on 11/18/2021   Component Date Value Ref Range Status    Hepatitis C Ab 11/20/2021 NONREACTIVE  NONREACTIVE Final    Comment:       The hepatitis C procedure used in our laboratory is a Chemiluminescent test specific for   three recombinant HCV antigens. A negative anti-HCV result indicates that the antibodies to   hepatitis C virus are not present at this time. Individuals with reactive anti-HCV should be considered infected and infectious until proven   otherwise. Confirmation of all equivocal or reactive results is recommended by ordering   HCV RNA by PCR. Reviewed patient's current plan of care and vital signs with nursing staff.     Labs reviewed: [x] Yes  Last EKG in EMR reviewed: [x] Yes  QTc: 476    Medications  Current Facility-Administered Medications: escitalopram (LEXAPRO) tablet 10 mg, 10 mg, Oral, Daily  phenytoin (DILANTIN) chewable tablet 100 mg, 100 mg, Oral, TID  acetaminophen (TYLENOL) tablet 650 mg, 650 mg, Oral, Q4H PRN  aluminum & magnesium hydroxide-simethicone (MAALOX) 200-200-20 MG/5ML suspension 30 mL, 30 mL, Oral, Q6H PRN  hydrOXYzine (ATARAX) tablet 50 mg, 50 mg, Oral, TID PRN  ibuprofen (ADVIL;MOTRIN) tablet 400 mg, 400 mg, Oral, Q6H PRN  polyethylene glycol (GLYCOLAX) packet 17 g, 17 g, Oral, Daily PRN  traZODone (DESYREL) tablet 50 mg, 50 mg, Oral, Nightly PRN    ASSESSMENT  Major depressive disorder, recurrent, severe with psychotic features ([facility] [address]         PLAN  Patient symptoms are: Improving   Continue current medication regimen, monitor tolerance of recently titrated Lexapro  Monitor need and frequency of PRN medications. Encourage participation in groups and milieu. Attempt to develop insight. Psycho-education conducted. Supportive Therapy conducted. Internal Medicine signed off  Encourage patient to reach out to inpatient rehabilitation services  Probable discharge is to be determined by MD  Follow-up daily while inpatient. Patient continues to be monitored in the inpatient psychiatric facility at [facility] for safety and stabilization. Patient continues to need, on a daily basis, active treatment furnished directly by or requiring the supervision of inpatient psychiatric personnel. Electronically signed by SPENCER Price CNP on 11/22/2021 at 2:42 PM    **This report has been created using voice recognition software. It may contain minor errors which are inherent in voice recognition technology. **    I independently saw and evaluated the patient. I reviewed the nurse practitioners documentation above. Any additional comments or changes to the nurse practitioners documentation are stated below otherwise agree with assessment. Plan will be as follows:  Spent 30 minutes with the patient, of that greater than 16 minutes was spent in supportive psychotherapy. Patient is denying side effects to medication. Working on placement at [facility]. Has faxed application. Tolerating Lexapro well. PLAN  Patient s symptoms   are improving  Continue with current medication for now  Attempt to develop insight  Psycho-education conducted. Supportive Therapy conducted.   Probable discharge is 2 to 3 days  Follow-up daily while on inpatient unit

## 2021-11-22 NOTE — PLAN OF CARE
Problem: Altered Mood, Depressive Behavior:  Goal: Able to verbalize and/or display a decrease in depressive symptoms  Description: Able to verbalize and/or display a decrease in depressive symptoms  11/22/2021 1001 by Apurva Hale RN  Outcome: Ongoing     Problem: Depressive Behavior With or Without Suicide Precautions:  Goal: Ability to disclose and discuss suicidal ideas will improve  Description: Ability to disclose and discuss suicidal ideas will improve  Outcome: Ongoing     Patient denies thoughts of self harm or thoughts to harm others. Patient denies hallucinations. Patient denies anxiety. Patient denies depression and remains social in the dayroom with select peers. Will continue to monitor and offer support and reassurance to patient as needed.

## 2021-11-22 NOTE — FLOWSHEET NOTE
*Patient participated in Spirituality Group        11/22/21 5680   Encounter Summary   Services provided to: Patient   Referral/Consult From: Rounding   Continue Visiting   (11/22/21)   Complexity of Encounter Moderate   Length of Encounter 30 minutes   Spiritual/Yazidi   Type Spiritual support   Assessment Calm;  Approachable   Intervention Active listening   Outcome Receptive; Engaged in conversation

## 2021-11-22 NOTE — GROUP NOTE
Group Therapy Note    Date: 11/22/2021    Group Start Time: 1100  Group End Time: 0473  Group Topic: Recreational    [facility] ARVIN Car[facility]        Group Therapy Note    Attendees:8/22         Patient's Goal:  Improving self esteem    Status After Intervention:  Improved    Participation Level:  Active Listener and Interactive    Participation Quality: Appropriate, Attentive, Sharing and Supportive      Speech:  normal      Thought Process/Content: Logical      Affective Functioning: Congruent      Mood: euphoric      Level of consciousness:  Alert, Oriented x4 and Attentive      Response to Learning: Able to verbalize current knowledge/experience, Able to verbalize/acknowledge new learning and Able to retain information    Modes of Intervention: Education, Support, Socialization, Exploration, Clarifying and Problem-solving      Discipline Responsible: Psychoeducational Specialist      Signature:  Analia Kumar

## 2021-11-22 NOTE — GROUP NOTE
Group Therapy Note    Date: 11/22/2021    Group Start Time: 0945  Group End Time: 1000  Group Topic: Group Therapy    CZ [facility] [address], LPN        GOAL SETTING GROUP    Attendees: 13/22         Patient's Goal:  To stay calm and positive     Status After Intervention:  Improved    Participation Level:  Active Listener and Interactive    Participation Quality: Appropriate and Attentive      Speech:  normal      Thought Process/Content: Logical      Affective Functioning: Congruent      Mood: euthymic      Level of consciousness:  Alert      Response to Learning: Able to verbalize current knowledge/experience, Able to retain information and Capable of insight      Endings: None Reported    Modes of Intervention: Education and Support      Discipline Responsible: Licensed Practical Nurse      Signature:  Myra Hicks LPN

## 2021-11-22 NOTE — PLAN OF CARE
Patient remains free from falls and physical injury. Patient was able to verbalize a decrease in depressive symptoms.

## 2021-11-22 NOTE — GROUP NOTE
Group Therapy Note    Date: 11/22/2021    Group Start Time: 1000  Group End Time: 8534  Group Topic: Psychotherapy    [address], LSW    patient refused to attend psychotherapy group at [facility] after encouragement from staff.   1:1 talk time provided as alternative to group session

## 2021-11-23 PROCEDURE — 6370000000 HC RX 637 (ALT 250 FOR IP): Performed by: NURSE PRACTITIONER

## 2021-11-23 PROCEDURE — 99232 SBSQ HOSP IP/OBS MODERATE 35: CPT | Performed by: PSYCHIATRY & NEUROLOGY

## 2021-11-23 PROCEDURE — 1240000000 HC EMOTIONAL WELLNESS R&B

## 2021-11-23 PROCEDURE — 6370000000 HC RX 637 (ALT 250 FOR IP): Performed by: PSYCHIATRY & NEUROLOGY

## 2021-11-23 PROCEDURE — 90833 PSYTX W PT W E/M 30 MIN: CPT | Performed by: PSYCHIATRY & NEUROLOGY

## 2021-11-23 RX ADMIN — ESCITALOPRAM OXALATE 10 MG: 10 TABLET ORAL at 08:22

## 2021-11-23 RX ADMIN — HYDROXYZINE HYDROCHLORIDE 50 MG: 50 TABLET, FILM COATED ORAL at 21:36

## 2021-11-23 RX ADMIN — PHENYTOIN 100 MG: 50 TABLET, CHEWABLE ORAL at 14:49

## 2021-11-23 RX ADMIN — PHENYTOIN 100 MG: 50 TABLET, CHEWABLE ORAL at 08:20

## 2021-11-23 RX ADMIN — PHENYTOIN 100 MG: 50 TABLET, CHEWABLE ORAL at 21:35

## 2021-11-23 RX ADMIN — TRAZODONE HYDROCHLORIDE 50 MG: 50 TABLET ORAL at 21:36

## 2021-11-23 NOTE — PLAN OF CARE
Problem: Altered Mood, Depressive Behavior:  Goal: Able to verbalize and/or display a decrease in depressive symptoms  Description: Able to verbalize and/or display a decrease in depressive symptoms  11/22/2021 2143 by Yousif Calvert RN  Outcome: Ongoing  Patient is bright, hopeful and states he feels ready for discharge. He reports feeling clear, and free of his addictions. Patient demonstrates insight regarding things he will have to do to maintain his sobriety. Safety plan reviewed with patient, agrees to approach staff when feeling upset. 15 minute and random checks maintained for safety. No violent or escalating behaviors noted during this shift. Patient is currently calm, controlled and medication-compliant. Problem: Depressive Behavior With or Without Suicide Precautions:  Goal: Ability to disclose and discuss suicidal ideas will improve  Description: Ability to disclose and discuss suicidal ideas will improve  11/22/2021 2143 by Yousif Calvert RN  Outcome: Ongoing  Patient denies suicidal ideation, homicidal ideation and hallucinations at this time.

## 2021-11-23 NOTE — GROUP NOTE
Group Therapy Note    Date: 11/23/2021    Group Start Time: 1100  Group End Time: 5564  Group Topic: Cognitive Skills    [address], CTRS        Group Therapy Note    Attendees: 6/22         Patient's Goal:  Learning how to prioritize    Status After Intervention:  Improved    Participation Level:  Active Listener and Interactive    Participation Quality: Appropriate, Attentive, Sharing and Supportive      Speech:  normal      Thought Process/Content: Logical      Affective Functioning: Congruent      Mood: euphoric      Level of consciousness:  Alert, Oriented x4 and Attentive      Response to Learning: Able to verbalize current knowledge/experience, Able to verbalize/acknowledge new learning and Able to retain information     Modes of Intervention: Education, Support, Socialization, Exploration, Clarifying, Problem-solving and Activity      Discipline Responsible: Psychoeducational Specialist      Signature:  Diana Maya

## 2021-11-23 NOTE — GROUP NOTE
Group Therapy Note    Date: 11/23/2021    Group Start Time: 1000  Group End Time: 1050  Group Topic: Psychotherapy    KWAKU BILLY REFUGIO Clancy, LSW        Group Therapy Note    Attendees: 10/22         Patient's Goal:  Discussion on Anxiety, the symptoms and how to treat anxiety. Status After Intervention:  Improved    Participation Level:  Active Listener and Interactive    Participation Quality: Appropriate, Attentive and Sharing      Speech:  normal      Thought Process/Content: Logical      Affective Functioning: Congruent      Mood: euthymic      Level of consciousness:  Alert and Attentive      Response to Learning: Able to verbalize current knowledge/experience and Able to verbalize/acknowledge new learning      Endings: None Reported    Modes of Intervention: Education and Support      Discipline Responsible: /Counselor      Signature:  REFUGIO Martinez, CORRINEW

## 2021-11-23 NOTE — PLAN OF CARE
Problem: Altered Mood, Depressive Behavior:  Goal: Able to verbalize and/or display a decrease in depressive symptoms  Description: Able to verbalize and/or display a decrease in depressive symptoms  11/23/2021 1039 by Paul Rosado LPN  Outcome: Ongoing  11/22/2021 2143 by Silvia Singleton RN  Outcome: Ongoing     Problem: Altered Mood, Depressive Behavior:  Goal: Able to verbalize and/or display a decrease in depressive symptoms  Description: Able to verbalize and/or display a decrease in depressive symptoms  11/23/2021 1039 by Paul Rosado LPN  Outcome: Ongoing  11/22/2021 2143 by Silvia Singleton RN  Outcome: Ongoing   Patient is able to verbalize depressive symptoms. Problem: Depressive Behavior With or Without Suicide Precautions:  Goal: Ability to disclose and discuss suicidal ideas will improve  Description: Ability to disclose and discuss suicidal ideas will improve  11/23/2021 1039 by Paul Rosado LPN  Outcome: Ongoing  11/22/2021 2143 by Silvia Singleton RN  Outcome: Ongoing   Patient is working on discussing suicidal ideas by attending group and learning to work through feelings.

## 2021-11-23 NOTE — PROGRESS NOTES
Daily Progress Note  11/23/2021    Patient Name: Leila De La Paz    CHIEF COMPLAINT: Suicide attempt by overdose         SUBJECTIVE:      Patient is seen today for a follow up assessment. Lorelei Vincent is observed participating in group activity and is social out on the milieu. He reports that he did speak with [facility] yesterday and filled out the paperwork and submitted the application as requested. He reports that he is waiting to hear back whether or not they will accept him. He was forward thinking with regards to transitioning to inpatient recovery, transition to a shelter without this level of service would likely result in relapse and further decompensation. He reports that his depression is improving and he is feeling more hopeful about the resources to get his \"life back on track\". He reports improvement in suicidal thoughts and continues to contract for safety. Appetite:  [x] Normal/Adequate/Unchanged  [] Increased  [] Decreased      Sleep:       [x] Normal/Adequate/Unchanged  [] Fair  [] Poor      Group Attendance on Unit:   [x] Yes  [] Selectively    [] No    Medication Side Effects: Patient denies any medication side effects at the time of assessment. Mental Status Exam  Level of consciousness: Alert and awake. Appearance: Appropriate attire for setting, seated in chair, with fair grooming and hygiene. Behavior/Motor: Approachable, engages in interview  Attitude toward examiner: Cooperative, attentive, good eye contact. Speech: Normal rate, normal volume, normal tone. Mood: \"Okay\"  Affect: Congruent  Thought processes: Linear and coherent. Goal directed, forward thinking. Thought content: Denies homicidal ideation. Suicidal Ideation: Endorses improvement in, continues to contract for safety  Delusions: No evidence of delusions. Denies paranoia. Perceptual Disturbance: Patient does not appear to be responding to internal stimuli. Denies auditory hallucinations.  Denies visual hallucinations. Cognition: Oriented to self, location, time, and situation. Memory: Intact. Insight & Judgement: Poor. Data   height is 5' 9\" (1.753 m) and weight is 160 lb (72.6 kg). His infrared temperature is 97.5 °F (36.4 °C). His blood pressure is 124/86 and his pulse is 97. His respiration is 14. Labs:   Admission on 11/18/2021   Component Date Value Ref Range Status    Hepatitis C Ab 11/20/2021 NONREACTIVE  NONREACTIVE Final    Comment:       The hepatitis C procedure used in our laboratory is a Chemiluminescent test specific for   three recombinant HCV antigens. A negative anti-HCV result indicates that the antibodies to   hepatitis C virus are not present at this time. Individuals with reactive anti-HCV should be considered infected and infectious until proven   otherwise. Confirmation of all equivocal or reactive results is recommended by ordering   HCV RNA by PCR. Reviewed patient's current plan of care and vital signs with nursing staff. Labs reviewed: [x] Yes  Last EKG in EMR reviewed: [x] Yes  QTc: 476    Medications  Current Facility-Administered Medications: escitalopram (LEXAPRO) tablet 10 mg, 10 mg, Oral, Daily  phenytoin (DILANTIN) chewable tablet 100 mg, 100 mg, Oral, TID  acetaminophen (TYLENOL) tablet 650 mg, 650 mg, Oral, Q4H PRN  aluminum & magnesium hydroxide-simethicone (MAALOX) 200-200-20 MG/5ML suspension 30 mL, 30 mL, Oral, Q6H PRN  hydrOXYzine (ATARAX) tablet 50 mg, 50 mg, Oral, TID PRN  ibuprofen (ADVIL;MOTRIN) tablet 400 mg, 400 mg, Oral, Q6H PRN  polyethylene glycol (GLYCOLAX) packet 17 g, 17 g, Oral, Daily PRN  traZODone (DESYREL) tablet 50 mg, 50 mg, Oral, Nightly PRN    ASSESSMENT  Major depressive disorder, recurrent, severe with psychotic features ([facility] [address]         PLAN  Patient symptoms are: Improving   Continue current medication regimen  Monitor need and frequency of PRN medications. Encourage participation in groups and milieu.   Attempt to develop insight. Psycho-education conducted. Supportive Therapy conducted. Internal Medicine signed off  Pending placement as [facility] recovery house  Probable discharge is to be determined by MD  Follow-up daily while inpatient. Patient continues to be monitored in the inpatient psychiatric facility at [facility] for safety and stabilization. Patient continues to need, on a daily basis, active treatment furnished directly by or requiring the supervision of inpatient psychiatric personnel. Electronically signed by SPENCER Edwards CNP on 11/23/2021 at 6:15 PM    **This report has been created using voice recognition software. It may contain minor errors which are inherent in voice recognition technology. **    I independently saw and evaluated the patient. I reviewed the nurse practitioners documentation above. Any additional comments or changes to the nurse practitioners documentation are stated below otherwise agree with assessment. Plan will be as follows:  Spent 30 minutes with the patient, of that greater than 16 minutes spent in supportive psychotherapy. Patient is denying side effects to medication. Forward-looking and constructive. Hopeful for disposition to his substance use rehab. Discussed if stable through tomorrow would consider discharge if placement obtain  PLAN  Patient s symptoms   are improving  Continue with current medication for now  Attempt to develop insight  Psycho-education conducted. Supportive Therapy conducted.   Probable discharge is pending placement  Follow-up daily while on inpatient unit      t

## 2021-11-23 NOTE — GROUP NOTE
Group Therapy Note    Date: 11/23/2021    Group Start Time: 1330  Group End Time: 4403  Group Topic: Recreational    [address], [facility]S        Group Therapy Note    Attendees: 9/20         Patient's Goal:  Identify positive coping skills through recreation and leisure    Status After Intervention:  Improved    Participation Level:  Active Listener and Interactive    Participation Quality: Appropriate and Attentive      Speech:  normal      Thought Process/Content: Logical      Affective Functioning: Congruent      Mood: euphoric      Level of consciousness:  Alert, Oriented x4 and Attentive      Response to Learning: Able to verbalize current knowledge/experience, Able to verbalize/acknowledge new learning and Able to retain information      Modes of Intervention: Education, Support, Socialization, Exploration, Clarifying, Problem-solving, Activity and Movement      Discipline Responsible: Psychoeducational Specialist      Signature:  Anabella Fung

## 2021-11-24 PROCEDURE — 6370000000 HC RX 637 (ALT 250 FOR IP): Performed by: PSYCHIATRY & NEUROLOGY

## 2021-11-24 PROCEDURE — 6370000000 HC RX 637 (ALT 250 FOR IP): Performed by: NURSE PRACTITIONER

## 2021-11-24 PROCEDURE — 90833 PSYTX W PT W E/M 30 MIN: CPT | Performed by: PSYCHIATRY & NEUROLOGY

## 2021-11-24 PROCEDURE — 1240000000 HC EMOTIONAL WELLNESS R&B

## 2021-11-24 PROCEDURE — 99232 SBSQ HOSP IP/OBS MODERATE 35: CPT | Performed by: PSYCHIATRY & NEUROLOGY

## 2021-11-24 RX ADMIN — ESCITALOPRAM OXALATE 10 MG: 10 TABLET ORAL at 10:35

## 2021-11-24 RX ADMIN — HYDROXYZINE HYDROCHLORIDE 50 MG: 50 TABLET, FILM COATED ORAL at 22:22

## 2021-11-24 RX ADMIN — PHENYTOIN 100 MG: 50 TABLET, CHEWABLE ORAL at 10:35

## 2021-11-24 RX ADMIN — TRAZODONE HYDROCHLORIDE 50 MG: 50 TABLET ORAL at 22:22

## 2021-11-24 RX ADMIN — PHENYTOIN 100 MG: 50 TABLET, CHEWABLE ORAL at 14:32

## 2021-11-24 RX ADMIN — PHENYTOIN 100 MG: 50 TABLET, CHEWABLE ORAL at 22:22

## 2021-11-24 NOTE — CARE COORDINATION
[facility] and spoke with Sosa the . Sosa reports not receiving an application for this pt.      CHIVO re faxed the housing application to [phone]

## 2021-11-24 NOTE — PLAN OF CARE
Problem: Altered Mood, Depressive Behavior:  Goal: Able to verbalize and/or display a decrease in depressive symptoms  Description: Able to verbalize and/or display a decrease in depressive symptoms  Outcome: Ongoing  Note: Patient appears brightened, states ready for discharge.

## 2021-11-24 NOTE — PROGRESS NOTES
Daily Progress Note  11/24/2021    Patient Name: Rodger Larry    CHIEF COMPLAINT: Suicide attempt by overdose         SUBJECTIVE:      Patient is seen today for a follow up assessment. Betina Felipe is interviewed today in private in the day area. He reports that he got good news and was accepted to [facility]. He reports that there will not be a bed available until Friday. He endorses significant concern that if he discharges to the street until Friday that he will make poor decisions and completely decompensate with all the progress he has made since hospitalization. He is at high risk for self-harm without discharge directly to inpatient rehabilitation. He continues to attend group and is able to contract for safety on the inpatient unit. He remains medication compliant and denies any side effects. Appetite:  [x] Normal/Adequate/Unchanged  [] Increased  [] Decreased      Sleep:       [x] Normal/Adequate/Unchanged  [] Fair  [] Poor      Group Attendance on Unit:   [x] Yes  [] Selectively    [] No    Medication Side Effects: Patient denies any medication side effects at the time of assessment. Mental Status Exam  Level of consciousness: Alert and awake. Appearance: Appropriate attire for setting, seated in chair, with fair grooming and hygiene. Behavior/Motor: Approachable, engages in interview  Attitude toward examiner: Cooperative, attentive, good eye contact. Speech: Normal rate, normal volume, normal tone. Mood: \"Good, but nervous\"  Affect: Congruent  Thought processes: Linear and coherent. Goal directed, forward thinking. Thought content: Denies homicidal ideation. Suicidal Ideation: Endorses improvement in, continues to contract for safety  Delusions: No evidence of delusions. Denies paranoia. Perceptual Disturbance: Patient does not appear to be responding to internal stimuli. Denies auditory hallucinations. Denies visual hallucinations.    Cognition: Oriented to self, location, time, and situation. Memory: Intact. Insight & Judgement: Poor. Data   height is 5' 9\" (1.753 m) and weight is 160 lb (72.6 kg). His temporal temperature is 97.1 °F (36.2 °C). His blood pressure is 111/75 and his pulse is 78. His respiration is 14. Labs:   Admission on 11/18/2021   Component Date Value Ref Range Status    Hepatitis C Ab 11/20/2021 NONREACTIVE  NONREACTIVE Final    Comment:       The hepatitis C procedure used in our laboratory is a Chemiluminescent test specific for   three recombinant HCV antigens. A negative anti-HCV result indicates that the antibodies to   hepatitis C virus are not present at this time. Individuals with reactive anti-HCV should be considered infected and infectious until proven   otherwise. Confirmation of all equivocal or reactive results is recommended by ordering   HCV RNA by PCR. Reviewed patient's current plan of care and vital signs with nursing staff. Labs reviewed: [x] Yes  Last EKG in EMR reviewed: [x] Yes  QTc: 476    Medications  Current Facility-Administered Medications: escitalopram (LEXAPRO) tablet 10 mg, 10 mg, Oral, Daily  phenytoin (DILANTIN) chewable tablet 100 mg, 100 mg, Oral, TID  acetaminophen (TYLENOL) tablet 650 mg, 650 mg, Oral, Q4H PRN  aluminum & magnesium hydroxide-simethicone (MAALOX) 200-200-20 MG/5ML suspension 30 mL, 30 mL, Oral, Q6H PRN  hydrOXYzine (ATARAX) tablet 50 mg, 50 mg, Oral, TID PRN  ibuprofen (ADVIL;MOTRIN) tablet 400 mg, 400 mg, Oral, Q6H PRN  polyethylene glycol (GLYCOLAX) packet 17 g, 17 g, Oral, Daily PRN  traZODone (DESYREL) tablet 50 mg, 50 mg, Oral, Nightly PRN    ASSESSMENT  Major depressive disorder, recurrent, severe with psychotic features ([facility] [address]         PLAN  Patient symptoms are: Improving   Continue current medication regimen  Monitor need and frequency of PRN medications. Encourage participation in groups and milieu. Attempt to develop insight. Psycho-education conducted.   Supportive Therapy conducted. Internal Medicine signed off  Patient has an available bed at [facility] on Friday, 11/26/2021  Probable discharge is to be determined by MD  Follow-up daily while inpatient. Patient continues to be monitored in the inpatient psychiatric facility at [facility] for safety and stabilization. Patient continues to need, on a daily basis, active treatment furnished directly by or requiring the supervision of inpatient psychiatric personnel. Electronically signed by SPENCER Keane CNP on 11/24/2021 at 5:27 PM    **This report has been created using voice recognition software. It may contain minor errors which are inherent in voice recognition technology. **      I independently saw and evaluated the patient. I reviewed the nurse practitioners documentation above. Any additional comments or changes to the nurse practitioners documentation are stated below otherwise agree with assessment. Plan will be as follows:  Patient denies side effects to medication. Spent 30 minutes with the patient, of that greater than 16 minutes was spent in supportive psychotherapy. Patient able to maintain safety at present time. Is excepted to [facility] on Friday. We will keep the patient safe in the hospital until such time he can be discharged safely directly to the program  PLAN  Patient s symptoms   are improving  Continue with current medication  Attempt to develop insight  Psycho-education conducted. Supportive Therapy conducted.   Probable discharge is Friday  Follow-up daily while on inpatient unit

## 2021-11-24 NOTE — PROGRESS NOTES
Pharmacy Med Education Group Note    Date: 11/24/2021  Start Time: 5449  End Time: 0433    Number Participants in Group:  6/13    Goal:  Patient will demonstrate an understanding of the medications intended purpose and possible adverse effects  Topic: [place] for Pharmacy Med Ed Group    Discipline Responsible:     OT  AT  [facility].  RT     X Other       Participation Level:     None  Minimal      X Active Listener    X Interactive    Monopolizing         Participation Quality:    X Appropriate  Inappropriate     X       Attentive        Intrusive          Sharing        Resistant          Supportive        Lethargic       Affective:     X Congruent  Incongruent  Blunted  Flat    Constricted  Anxious  Elated  Angry    Labile  Depressed  Other         Cognitive:    X Alert  Oriented PPTP     Concentration   X G  F  P   Attention Span   X G  F  P   Short-Term Memory   X G  F  P   Long-Term Memory  G  F  P   ProblemSolving/  Decision Making  G  F  P   Ability to Process  Information   X G  F  P      Contributing Factors             Delusional             Hallucinating             Flight of Ideas             Other:       Modes of Intervention:    X Education   X Support  Exploration    Clarifying  Problem Solving  Confrontation    Socialization  Limit Setting  Reality Testing    Activity  Movement  Media    Other:            Response to Learning:    X Able to verbalize current knowledge/experience    Able to verbalize/acknowledge new learning    Able to retain information    Capable of insight    Able to change behavior    Progressing to goal    Other:        Comments:     Selene FinneganD, BCPS  11/24/2021 3:57 PM

## 2021-11-24 NOTE — GROUP NOTE
Group Therapy Note    Date: 11/24/2021    Group Start Time: 1000  Group End Time: 4961  Group Topic: Psychotherapy    KWAKU BHI BETY    REFUGIO Webster LSW        Group Therapy Note    Attendees: 6/16         Patient's Goal:  Discussion on Gratitude and what the benefits of gratitude can be and how the benefits can assist with negative thinking patterns. Status After Intervention:  Improved    Participation Level:  Active Listener and Interactive    Participation Quality: Appropriate and Attentive      Speech:  normal      Thought Process/Content: Logical      Affective Functioning: Congruent      Mood: euthymic      Level of consciousness:  Alert and Attentive      Response to Learning: Able to verbalize current knowledge/experience and Able to verbalize/acknowledge new learning      Endings: None Reported    Modes of Intervention: Education and Support      Discipline Responsible: /Counselor      Signature:  REFUGIO Webster LSW

## 2021-11-24 NOTE — CARE COORDINATION
SW received call from Soo Mujica at [facility] who shared they can accept pt to housing on Friday 11/26/2021.

## 2021-11-24 NOTE — CARE COORDINATION
SW spoke with pt regarding discharge plan, confirming pt is agreeable to discharge to [address] [place] [address] on Friday 11/26/2021 with pt needing to arrive by 10:00 am. Pt would also like services to be through [facility] and an appointment was scheduled for [facility] 11/29/2021 at 8:15 am

## 2021-11-24 NOTE — GROUP NOTE
Group Therapy Note    Date: 11/23/2021    Group Start Time: 2003  Group End Time: 2025  Group Topic: Wrap-Up    KWAKU Madrigal        Group Therapy Note    Attendees: 10/17             Status After Intervention:  Improved    Participation Level:  Active Listener    Participation Quality: Appropriate      Speech:  normal      Thought Process/Content: Logical      Affective Functioning: Congruent      Mood: elevated      Level of consciousness:  Alert      Response to Learning: Able to verbalize current knowledge/experience      Endings: None Reported    Modes of Intervention: Support and Socialization      Discipline Responsible: Behavorial Health Tech      Signature:  Katalian Grewal

## 2021-11-24 NOTE — GROUP NOTE
Group Therapy Note    Date: 11/24/2021    Group Start Time: 1100  Group End Time: 9869  Group Topic: Recreational    [address], ARVIN        Group Therapy Note    Attendees: 6/14         Patient's Goal:  Increase communication skills    Status After Intervention:  Improved    Participation Level:  Active Listener and Interactive    Participation Quality: Appropriate, Attentive, Sharing and Supportive      Speech:  normal      Thought Process/Content: Logical      Affective Functioning: Congruent      Mood: euphoric      Level of consciousness:  Alert, Oriented x4 and Attentive      Response to Learning: Able to verbalize current knowledge/experience, Able to verbalize/acknowledge new learning, Able to retain information and Capable of insight    Modes of Intervention: Education, Support, Socialization, Exploration, Clarifying, Problem-solving and Activity      Discipline Responsible: Psychoeducational Specialist      Signature:  Clair Zamudio

## 2021-11-24 NOTE — PLAN OF CARE
Problem: Altered Mood, Depressive Behavior:  Goal: Able to verbalize and/or display a decrease in depressive symptoms  Description: Able to verbalize and/or display a decrease in depressive symptoms       Problem: Depressive Behavior With or Without Suicide Precautions:  Goal: Ability to disclose and discuss suicidal ideas will improve  Description: Ability to disclose and discuss suicidal ideas will improve     Pt denies suicidal ideations at this time. Pt agreed to seek staff at anytime he felt like any urges to harm self would arise. Safety checks maintained [id number]. Pt remains free from self harm this shift. Pt denies wanting to cause harm to self or others at this time. Pt encouraged to seek nursing staff at anytime if he felt at danger to themselves or others. Pt states understanding. Safety checks maintained [id number]    Patient is complaining of anxiety at this time. Stating that they feel restless and are having trouble sleeping and calming down in order to rest this evening. Medication was given as prescribed for increased anxiety see MAR. Pt denies SI/HI reports he is doing much better and is looking forward to do inpatient treatment once he leaves here.

## 2021-11-25 PROCEDURE — 1240000000 HC EMOTIONAL WELLNESS R&B

## 2021-11-25 PROCEDURE — 6370000000 HC RX 637 (ALT 250 FOR IP): Performed by: NURSE PRACTITIONER

## 2021-11-25 PROCEDURE — 6370000000 HC RX 637 (ALT 250 FOR IP): Performed by: PSYCHIATRY & NEUROLOGY

## 2021-11-25 PROCEDURE — 99231 SBSQ HOSP IP/OBS SF/LOW 25: CPT | Performed by: NURSE PRACTITIONER

## 2021-11-25 RX ORDER — PHENYTOIN 50 MG/1
100 TABLET, CHEWABLE ORAL 3 TIMES DAILY
Qty: 180 TABLET | Refills: 0 | Status: ON HOLD | OUTPATIENT
Start: 2021-11-25 | End: 2022-07-05 | Stop reason: SDUPTHER

## 2021-11-25 RX ORDER — ESCITALOPRAM OXALATE 10 MG/1
10 TABLET ORAL DAILY
Qty: 30 TABLET | Refills: 0 | Status: ON HOLD | OUTPATIENT
Start: 2021-11-26 | End: 2022-07-05 | Stop reason: SDUPTHER

## 2021-11-25 RX ORDER — TRAZODONE HYDROCHLORIDE 50 MG/1
50 TABLET ORAL NIGHTLY PRN
Qty: 30 TABLET | Refills: 0 | Status: ON HOLD | OUTPATIENT
Start: 2021-11-25 | End: 2022-07-05 | Stop reason: SDUPTHER

## 2021-11-25 RX ORDER — HYDROXYZINE 50 MG/1
50 TABLET, FILM COATED ORAL 3 TIMES DAILY PRN
Qty: 45 TABLET | Refills: 0 | Status: SHIPPED | OUTPATIENT
Start: 2021-11-25 | End: 2021-12-10

## 2021-11-25 RX ADMIN — PHENYTOIN 100 MG: 50 TABLET, CHEWABLE ORAL at 14:29

## 2021-11-25 RX ADMIN — PHENYTOIN 100 MG: 50 TABLET, CHEWABLE ORAL at 23:03

## 2021-11-25 RX ADMIN — TRAZODONE HYDROCHLORIDE 50 MG: 50 TABLET ORAL at 23:03

## 2021-11-25 RX ADMIN — ESCITALOPRAM OXALATE 10 MG: 10 TABLET ORAL at 08:20

## 2021-11-25 RX ADMIN — PHENYTOIN 100 MG: 50 TABLET, CHEWABLE ORAL at 08:20

## 2021-11-25 NOTE — GROUP NOTE
wellness/recreational                                                                      Group Therapy Note    Date: 11/25/2021    Group Start Time: 1100  Group End Time: 1130  Group Topic: Recreational    ST[facility] LEANDROI C    Justine Pollack LPN        Group Therapy Note    Attendees: 11/17             Status After Intervention:  Improved    Participation Level:  Active Listener and Interactive    Participation Quality: Appropriate, Attentive and Sharing      Speech:  normal      Thought Process/Content: Logical      Affective Functioning: Congruent      Level of consciousness:  Alert and Attentive      Response to Learning: Able to verbalize current knowledge/experience      Endings: None Reported    Modes of Intervention: Support and Socialization      Discipline Responsible: Licensed Practical Nurse      Signature:  Justine Pollack LPN

## 2021-11-25 NOTE — PROGRESS NOTES
Daily Progress Note  11/25/2021    Patient Name: Michael Fernandez    CHIEF COMPLAINT: Suicide attempt by overdose         SUBJECTIVE:      Patient is seen today for a follow up assessment. Faith Levi is observed actively participating in recreational therapy. He is agreeable to today's interview. He reports that he is looking forward to discharging to the [facility] and feels that hospitalization has been beneficial in helping him build coping skills for obstacles that are certain to come. He reports improvement in his depression and suicidal ideation. He reports that he is more hopeful and thankful that he is entering a structured environment to keep him on the right track to maintain sobriety. He denies any auditory or visual hallucinations. He denies any concerns with his current medication regimen. Appetite:  [x] Normal/Adequate/Unchanged  [] Increased  [] Decreased      Sleep:       [x] Normal/Adequate/Unchanged  [] Fair  [] Poor      Group Attendance on Unit:   [x] Yes  [] Selectively    [] No    Medication Side Effects: Patient denies any medication side effects at the time of assessment. Mental Status Exam  Level of consciousness: Alert and awake. Appearance: Appropriate attire for setting, seated in chair, with fair grooming and hygiene. Behavior/Motor: Approachable, engages in interview  Attitude toward examiner: Cooperative, attentive, good eye contact. Speech: Normal rate, normal volume, normal tone. Mood: \"Pretty good\"  Affect: Congruent  Thought processes: Linear and coherent. Goal directed, forward thinking. Thought content: Denies homicidal ideation. Suicidal Ideation: Endorses improvement in, continues to contract for safety  Delusions: No evidence of delusions. Denies paranoia. Perceptual Disturbance: Patient does not appear to be responding to internal stimuli. Denies auditory hallucinations. Denies visual hallucinations.    Cognition: Oriented to self, location, time, and situation. Memory: Intact. Insight & Judgement: Poor. Though improving    Data   height is 5' 9\" (1.753 m) and weight is 160 lb (72.6 kg). His temporal temperature is 97.3 °F (36.3 °C). His blood pressure is 102/64 and his pulse is 85. His respiration is 14. Labs:   Admission on 11/18/2021   Component Date Value Ref Range Status    Hepatitis C Ab 11/20/2021 NONREACTIVE  NONREACTIVE Final    Comment:       The hepatitis C procedure used in our laboratory is a Chemiluminescent test specific for   three recombinant HCV antigens. A negative anti-HCV result indicates that the antibodies to   hepatitis C virus are not present at this time. Individuals with reactive anti-HCV should be considered infected and infectious until proven   otherwise. Confirmation of all equivocal or reactive results is recommended by ordering   HCV RNA by PCR. Reviewed patient's current plan of care and vital signs with nursing staff. Labs reviewed: [x] Yes  Last EKG in EMR reviewed: [x] Yes  QTc: 476    Medications  Current Facility-Administered Medications: escitalopram (LEXAPRO) tablet 10 mg, 10 mg, Oral, Daily  phenytoin (DILANTIN) chewable tablet 100 mg, 100 mg, Oral, TID  acetaminophen (TYLENOL) tablet 650 mg, 650 mg, Oral, Q4H PRN  aluminum & magnesium hydroxide-simethicone (MAALOX) 200-200-20 MG/5ML suspension 30 mL, 30 mL, Oral, Q6H PRN  hydrOXYzine (ATARAX) tablet 50 mg, 50 mg, Oral, TID PRN  ibuprofen (ADVIL;MOTRIN) tablet 400 mg, 400 mg, Oral, Q6H PRN  polyethylene glycol (GLYCOLAX) packet 17 g, 17 g, Oral, Daily PRN  traZODone (DESYREL) tablet 50 mg, 50 mg, Oral, Nightly PRN    ASSESSMENT  Major depressive disorder, recurrent, severe with psychotic features ([facility] [address]         PLAN  Patient symptoms are: Improving   Continue current medication regimen  Monitor need and frequency of PRN medications. Encourage participation in groups and milieu. Attempt to develop insight.   Psycho-education conducted. Supportive Therapy conducted. Internal Medicine signed off  Patient has an available bed at [facility], needs to arrive by 10 AM  Probable discharge is tomorrow to [facility]  Follow-up daily while inpatient. Patient continues to be monitored in the inpatient psychiatric facility at [facility] for safety and stabilization. Patient continues to need, on a daily basis, active treatment furnished directly by or requiring the supervision of inpatient psychiatric personnel. Electronically signed by SPENCER Mueller CNP on 11/25/2021 at 3:50 PM    **This report has been created using voice recognition software. It may contain minor errors which are inherent in voice recognition technology. **

## 2021-11-25 NOTE — PROGRESS NOTES
Behavioral Services                                              Medicare Re-Certification    I certify that the inpatient psychiatric hospital services furnished since the previous certification/re-certification were, and continue to be, medically necessary for;    [x] (1) Treatment which could reasonably be expected to improve the patient's condition,    [x] (2) Or for diagnostic study. Estimated length of stay/service 1-2 days    Plan for post-hospital care [facility]    This patient continues to need, on a daily basis, active treatment furnished directly by or requiring the supervision of inpatient psychiatric personnel.     Electronically signed by Jose Manzo MD on 11/25/2021 at 3:00 PM

## 2021-11-25 NOTE — GROUP NOTE
Group Therapy Note    Date: 11/25/2021    Group Start Time: 1330  Group End Time: 1430  Group Topic: Recreational    [address], [facility]        Group Therapy Note    Attendees: 5/15         Patient's Goal:  Identify positive coping skills through recreation and leisure     Notes:       Status After Intervention:  Improved    Participation Level:  Active Listener and Interactive    Participation Quality: Appropriate, Attentive, Sharing and Supportive      Speech:  normal      Thought Process/Content: Logical      Affective Functioning: Congruent      Mood: euphoric      Level of consciousness:  Alert, Oriented x4 and Attentive      Response to Learning: Able to verbalize current knowledge/experience, Able to verbalize/acknowledge new learning and Able to retain information      Modes of Intervention: Education, Support, Socialization, Exploration, Clarifying, Problem-solving and Activity      Discipline Responsible: Psychoeducational Specialist      Signature:  Chris Le

## 2021-11-25 NOTE — PLAN OF CARE
Patient has been out in the milieu and social with peers. Patient states he has been eating and sleeping okay. Patient states his mood is improved. Patient denies any suicidal thoughts at this time. Patient agrees to come talk with staff if having any thoughts to harm himself this shift. 15 min rounds continued for patient safety. Problem: Altered Mood, Depressive Behavior:  Goal: Able to verbalize and/or display a decrease in depressive symptoms  Description: Able to verbalize and/or display a decrease in depressive symptoms  11/24/2021 2234 by Ruiz Vera RN  Outcome: Ongoing  11/24/2021 1738 by Oskar Palomino RN  Outcome: Ongoing  Note: Patient appears brightened, states ready for discharge.      Problem: Depressive Behavior With or Without Suicide Precautions:  Goal: Ability to disclose and discuss suicidal ideas will improve  Description: Ability to disclose and discuss suicidal ideas will improve  Outcome: Ongoing

## 2021-11-25 NOTE — PLAN OF CARE
Problem: Altered Mood, Depressive Behavior:  Goal: Able to verbalize and/or display a decrease in depressive symptoms  Description: Able to verbalize and/or display a decrease in depressive symptoms  11/25/2021 0856 by Carolyne Ricardo RN  Outcome: Ongoing     Problem: Depressive Behavior With or Without Suicide Precautions:  Goal: Ability to disclose and discuss suicidal ideas will improve  Description: Ability to disclose and discuss suicidal ideas will improve  11/25/2021 0856 by Carolyne Ricardo RN  Outcome: Ongoing     Patient denies thoughts of self harm or thoughts to harm others. Patient denies hallucinations of auditory or visual origin. Patient cooperative and selectively social on the unit. Patient denies depression. Will continue to monitor and offer support as needed.

## 2021-11-25 NOTE — GROUP NOTE
Group Therapy Note    Date: 11/24/2021    Group Start Time: 2005  Group End Time: 2027  Group Topic: Wrap-Up    KWAKU Madrigal        Group Therapy Note    Attendees: 8/16             Status After Intervention:  Improved    Participation Level:  Active Listener    Participation Quality: Appropriate      Speech:  normal      Thought Process/Content: Logical      Affective Functioning: Congruent      Mood: elevated      Level of consciousness:  Alert      Response to Learning: Able to verbalize current knowledge/experience      Endings: None Reported    Modes of Intervention: Education      Discipline Responsible: Behavorial Health Tech      Signature:  Katalina Grewal

## 2021-11-25 NOTE — GROUP NOTE
Group Therapy Note    Date: 11/25/2021    Group Start Time: 0900  Group End Time: 0930  Group Topic: Community Meeting    KWAKU ALBERT    Celestina Rabago LPN        Group Therapy Note    Attendees:11/17         Patient's Goal. Patient verbalized the understanding of goal setting. Status After Intervention:  Improved    Participation Level:  Active Listener and Interactive    Participation Quality: Appropriate, Sharing and Supportive      Speech:  normal      Thought Process/Content: Logical      Level of consciousness:  Alert and Attentive      Response to Learning: Able to verbalize/acknowledge new learning and Able to retain information      Endings: None Reported    Modes of Intervention: Education, Support and Socialization      Discipline Responsible: Licensed Practical Nurse      Signature:  Celestina Rabago LPN

## 2021-11-25 NOTE — BH NOTE
patient refused to attend psychoeducation group at 1030 after encouragement from staff.   1:1 talk time provided as alternative to group session

## 2021-11-26 VITALS
BODY MASS INDEX: 23.7 KG/M2 | HEART RATE: 79 BPM | WEIGHT: 160 LBS | SYSTOLIC BLOOD PRESSURE: 116 MMHG | RESPIRATION RATE: 14 BRPM | DIASTOLIC BLOOD PRESSURE: 66 MMHG | HEIGHT: 69 IN | TEMPERATURE: 97.5 F

## 2021-11-26 PROCEDURE — 6370000000 HC RX 637 (ALT 250 FOR IP): Performed by: NURSE PRACTITIONER

## 2021-11-26 PROCEDURE — 99239 HOSP IP/OBS DSCHRG MGMT >30: CPT | Performed by: PSYCHIATRY & NEUROLOGY

## 2021-11-26 PROCEDURE — 6370000000 HC RX 637 (ALT 250 FOR IP): Performed by: PSYCHIATRY & NEUROLOGY

## 2021-11-26 RX ADMIN — ESCITALOPRAM OXALATE 10 MG: 10 TABLET ORAL at 08:11

## 2021-11-26 RX ADMIN — PHENYTOIN 100 MG: 50 TABLET, CHEWABLE ORAL at 08:10

## 2021-11-26 ASSESSMENT — PAIN SCALES - GENERAL: PAINLEVEL_OUTOF10: 0
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
## 2021-11-26 NOTE — PLAN OF CARE
Problem: Altered Mood, Depressive Behavior:  Goal: Able to verbalize and/or display a decrease in depressive symptoms  Description: Able to verbalize and/or display a decrease in depressive symptoms  11/26/2021 0819 by Paris Catsle RN  Outcome: Met This Shift  Pt states that he is feeling better and that he feels ready to go home. He was social with peers and medication compliant. Problem: Depressive Behavior With or Without Suicide Precautions:  Goal: Ability to disclose and discuss suicidal ideas will improve  Description: Ability to disclose and discuss suicidal ideas will improve  11/26/2021 0819 by Paris Castle RN  Outcome: Met This Shift  Pt denied current suicidal ideations.

## 2021-11-26 NOTE — PLAN OF CARE
Problem: Altered Mood, Depressive Behavior:  Goal: Able to verbalize and/or display a decrease in depressive symptoms  Description: Able to verbalize and/or display a decrease in depressive symptoms  Outcome: Ongoing     Problem: Depressive Behavior With or Without Suicide Precautions:  Goal: Ability to disclose and discuss suicidal ideas will improve  Description: Ability to disclose and discuss suicidal ideas will improve  Outcome: Ongoing  Note:  Pt denies suicidal ideations at this time. Pt agreed to seek staff at anytime he/she felt like any urges to harm self would arise. Safety checks maintained [id number].

## 2021-11-26 NOTE — BH NOTE
[facility]  Discharge Note    Pt discharged with followings belongings:   Dentures: None  Vision - Corrective Lenses: None  Hearing Aid: None  Jewelry: None  Body Piercings Removed: N/A  Clothing: None  Were All Patient Medications Collected?: Not Applicable  Other Valuables: None   Valuables sent home with pt or returned to patient. Patient education on aftercare instructions: yes  Information faxed to [facility] by staff  at 8:41 AM .Patient verbalize understanding of AVS:  yes. Status EXAM upon discharge:  Status and Exam  Normal: Yes  Facial Expression: Brightened  Affect: Appropriate  Level of Consciousness: Alert  Mood:Normal: Yes  Mood: Anxious  Motor Activity:Normal: Yes  Interview Behavior: Cooperative  Preception: [place] to Person, [place]vet[place] Scarlet to Time, [place] to Place, [place] to Situation  Attention:Normal: Yes  Attention: Distractible  Thought Processes: Circumstantial  Thought Content:Normal: Yes  Thought Content: Preoccupations  Hallucinations: None  Delusions: No  Memory:Normal: Yes  Memory: Poor Remote  Insight and Judgment: Yes  Insight and Judgment: Poor Judgment  Present Suicidal Ideation: No  Present Homicidal Ideation: No      Metabolic Screening:    Lab Results   Component Value Date    LABA1C 5.4 11/15/2021       No results found for: CHOL  No results found for: TRIG  No results found for: HDL  No components found for: LDLCAL  No results found for: LABVLDL     Pt discharged to [place] [address] transported by black and white cab.     Paris Benton RN

## 2021-11-30 NOTE — CARE COORDINATION
Name: Harry Balderas    : 1965    Discharge Date: 2021    Primary Auth/Cert #: Pending retro request    Destination:Pt discharged to [address] [place] [address]     Discharge Medications:      Medication List      START taking these medications    escitalopram 10 MG tablet  Commonly known as: LEXAPRO  Take 1 tablet by mouth daily  Notes to patient: For depression     hydrOXYzine 50 MG tablet  Commonly known as: ATARAX  Take 1 tablet by mouth 3 times daily as needed for Anxiety  Notes to patient: For anxiety     traZODone 50 MG tablet  Commonly known as: DESYREL  Take 1 tablet by mouth nightly as needed for Sleep  Notes to patient: For sleep        CONTINUE taking these medications    phenytoin 50 MG tablet  Commonly known as: DILANTIN  Take 2 tablets by mouth 3 times daily  Notes to patient: For seizures            Where to Get Your Medications      These medications were sent to [facility] - [address] [place] [address]    Phone: [phone]   · escitalopram 10 MG tablet  · hydrOXYzine 50 MG tablet  · phenytoin 50 MG tablet  · traZODone 50 MG tablet         Follow Up Appointment: [address]  [phone]    On 2021  Pt has appointment at the med clinic at 8:15 am

## 2021-12-16 PROBLEM — R77.8 ELEVATED TROPONIN: Status: RESOLVED | Noted: 2021-11-15 | Resolved: 2021-12-16

## 2021-12-16 PROBLEM — R79.89 ELEVATED TROPONIN: Status: RESOLVED | Noted: 2021-11-15 | Resolved: 2021-12-16

## 2022-07-04 ENCOUNTER — HOSPITAL ENCOUNTER (INPATIENT)
Age: 57
LOS: 1 days | Discharge: HOME | DRG: 469 | End: 2022-07-05
Attending: EMERGENCY MEDICINE | Admitting: INTERNAL MEDICINE
Payer: MEDICARE

## 2022-07-04 ENCOUNTER — APPOINTMENT (OUTPATIENT)
Dept: GENERAL RADIOLOGY | Age: 57
DRG: 469 | End: 2022-07-04
Payer: MEDICARE

## 2022-07-04 DIAGNOSIS — R77.8 TROPONIN LEVEL ELEVATED: ICD-10-CM

## 2022-07-04 DIAGNOSIS — N17.9 AKI (ACUTE KIDNEY INJURY) (HCC): ICD-10-CM

## 2022-07-04 DIAGNOSIS — N17.9 ACUTE KIDNEY INJURY (HCC): Primary | ICD-10-CM

## 2022-07-04 PROBLEM — E87.5 HYPERKALEMIA: Status: ACTIVE | Noted: 2022-07-04

## 2022-07-04 PROBLEM — E87.20 METABOLIC ACIDOSIS: Status: ACTIVE | Noted: 2022-07-04

## 2022-07-04 PROBLEM — I10 HTN (HYPERTENSION): Status: ACTIVE | Noted: 2022-07-04

## 2022-07-04 PROBLEM — Z91.199 NON-COMPLIANCE: Status: ACTIVE | Noted: 2022-07-04

## 2022-07-04 PROBLEM — F10.10 ALCOHOL ABUSE: Status: ACTIVE | Noted: 2022-07-04

## 2022-07-04 LAB
-: ABNORMAL
ABSOLUTE EOS #: <0.03 K/UL (ref 0–0.44)
ABSOLUTE IMMATURE GRANULOCYTE: 0.03 K/UL (ref 0–0.3)
ABSOLUTE LYMPH #: 2.66 K/UL (ref 1.1–3.7)
ABSOLUTE MONO #: 0.8 K/UL (ref 0.1–1.2)
ALBUMIN SERPL-MCNC: 5.5 G/DL (ref 3.5–5.2)
ALBUMIN/GLOBULIN RATIO: 1.3 (ref 1–2.5)
ALP BLD-CCNC: 121 U/L (ref 40–129)
ALT SERPL-CCNC: 36 U/L (ref 5–41)
AMPHETAMINE SCREEN URINE: NEGATIVE
ANION GAP SERPL CALCULATED.3IONS-SCNC: 14 MMOL/L (ref 9–17)
ANION GAP SERPL CALCULATED.3IONS-SCNC: 17 MMOL/L (ref 9–17)
ANION GAP SERPL CALCULATED.3IONS-SCNC: 29 MMOL/L (ref 9–17)
AST SERPL-CCNC: 75 U/L
BACTERIA: ABNORMAL
BARBITURATE SCREEN URINE: NEGATIVE
BASOPHILS # BLD: 0 % (ref 0–2)
BASOPHILS ABSOLUTE: <0.03 K/UL (ref 0–0.2)
BENZODIAZEPINE SCREEN, URINE: NEGATIVE
BILIRUB SERPL-MCNC: 0.74 MG/DL (ref 0.3–1.2)
BILIRUBIN DIRECT: 0.1 MG/DL
BILIRUBIN URINE: NEGATIVE
BILIRUBIN, INDIRECT: 0.64 MG/DL (ref 0–1)
BUN BLDV-MCNC: 40 MG/DL (ref 6–20)
BUN BLDV-MCNC: 55 MG/DL (ref 6–20)
BUN BLDV-MCNC: 64 MG/DL (ref 6–20)
CALCIUM IONIZED: 1.06 MMOL/L (ref 1.13–1.33)
CALCIUM SERPL-MCNC: 8.4 MG/DL (ref 8.6–10.4)
CALCIUM SERPL-MCNC: 8.5 MG/DL (ref 8.6–10.4)
CALCIUM SERPL-MCNC: 9.8 MG/DL (ref 8.6–10.4)
CANNABINOID SCREEN URINE: NEGATIVE
CASTS UA: ABNORMAL /LPF (ref 0–8)
CHLORIDE BLD-SCNC: 102 MMOL/L (ref 98–107)
CHLORIDE BLD-SCNC: 104 MMOL/L (ref 98–107)
CHLORIDE BLD-SCNC: 96 MMOL/L (ref 98–107)
CO2: 18 MMOL/L (ref 20–31)
CO2: 20 MMOL/L (ref 20–31)
CO2: 23 MMOL/L (ref 20–31)
COCAINE METABOLITE, URINE: POSITIVE
COLOR: YELLOW
COMPLEMENT C3: 212 MG/DL (ref 90–180)
COMPLEMENT C4: 38 MG/DL (ref 10–40)
CREAT SERPL-MCNC: 1.59 MG/DL (ref 0.7–1.2)
CREAT SERPL-MCNC: 2.18 MG/DL (ref 0.7–1.2)
CREAT SERPL-MCNC: 4.04 MG/DL (ref 0.7–1.2)
CREATININE URINE: 231.5 MG/DL (ref 39–259)
D-DIMER QUANTITATIVE: 0.2 MG/L FEU
EOSINOPHILS RELATIVE PERCENT: 0 % (ref 1–4)
EPITHELIAL CELLS UA: ABNORMAL /HPF (ref 0–5)
FREE KAPPA/LAMBDA RATIO: 1.3 (ref 0.26–1.65)
GFR AFRICAN AMERICAN: 19 ML/MIN
GFR AFRICAN AMERICAN: 38 ML/MIN
GFR AFRICAN AMERICAN: 55 ML/MIN
GFR NON-AFRICAN AMERICAN: 16 ML/MIN
GFR NON-AFRICAN AMERICAN: 32 ML/MIN
GFR NON-AFRICAN AMERICAN: 46 ML/MIN
GFR SERPL CREATININE-BSD FRML MDRD: ABNORMAL ML/MIN/{1.73_M2}
GLUCOSE BLD-MCNC: 129 MG/DL (ref 70–99)
GLUCOSE BLD-MCNC: 139 MG/DL (ref 70–99)
GLUCOSE BLD-MCNC: 148 MG/DL (ref 70–99)
GLUCOSE URINE: NEGATIVE
HCT VFR BLD CALC: 52.4 % (ref 40.7–50.3)
HEMOGLOBIN: 15.9 G/DL (ref 13–17)
IMMATURE GRANULOCYTES: 0 %
KAPPA FREE LIGHT CHAINS QNT: 1.77 MG/DL (ref 0.37–1.94)
KETONES, URINE: ABNORMAL
LAMBDA FREE LIGHT CHAINS QNT: 1.36 MG/DL (ref 0.57–2.63)
LEUKOCYTE ESTERASE, URINE: NEGATIVE
LIPASE: 9 U/L (ref 13–60)
LYMPHOCYTES # BLD: 23 % (ref 24–43)
MAGNESIUM: 2.8 MG/DL (ref 1.6–2.6)
MCH RBC QN AUTO: 23.9 PG (ref 25.2–33.5)
MCHC RBC AUTO-ENTMCNC: 30.3 G/DL (ref 28.4–34.8)
MCV RBC AUTO: 78.7 FL (ref 82.6–102.9)
METHADONE SCREEN, URINE: NEGATIVE
MONOCYTES # BLD: 7 % (ref 3–12)
NITRITE, URINE: NEGATIVE
NRBC AUTOMATED: 0 PER 100 WBC
OPIATES, URINE: NEGATIVE
OXYCODONE SCREEN URINE: NEGATIVE
PDW BLD-RTO: 16.9 % (ref 11.8–14.4)
PH UA: 5 (ref 5–8)
PHENCYCLIDINE, URINE: NEGATIVE
PLATELET # BLD: 306 K/UL (ref 138–453)
PMV BLD AUTO: 9.7 FL (ref 8.1–13.5)
POTASSIUM SERPL-SCNC: 4.1 MMOL/L (ref 3.7–5.3)
POTASSIUM SERPL-SCNC: 4.4 MMOL/L (ref 3.7–5.3)
POTASSIUM SERPL-SCNC: 5.5 MMOL/L (ref 3.7–5.3)
PRO-BNP: 364 PG/ML
PROTEIN UA: ABNORMAL
RBC # BLD: 6.66 M/UL (ref 4.21–5.77)
RBC # BLD: ABNORMAL 10*6/UL
RBC UA: ABNORMAL /HPF (ref 0–4)
SEG NEUTROPHILS: 70 % (ref 36–65)
SEGMENTED NEUTROPHILS ABSOLUTE COUNT: 7.94 K/UL (ref 1.5–8.1)
SODIUM BLD-SCNC: 139 MMOL/L (ref 135–144)
SODIUM BLD-SCNC: 141 MMOL/L (ref 135–144)
SODIUM BLD-SCNC: 143 MMOL/L (ref 135–144)
SODIUM,UR: 58 MMOL/L
SPECIFIC GRAVITY UA: 1.02 (ref 1–1.03)
TEST INFORMATION: ABNORMAL
TOTAL PROTEIN, URINE: 19 MG/DL
TOTAL PROTEIN: 9.7 G/DL (ref 6.4–8.3)
TROPONIN, HIGH SENSITIVITY: 20 NG/L (ref 0–22)
TROPONIN, HIGH SENSITIVITY: 24 NG/L (ref 0–22)
TURBIDITY: CLEAR
URINE HGB: ABNORMAL
UROBILINOGEN, URINE: NORMAL
WBC # BLD: 11.5 K/UL (ref 3.5–11.3)
WBC UA: ABNORMAL /HPF (ref 0–5)

## 2022-07-04 PROCEDURE — 86334 IMMUNOFIX E-PHORESIS SERUM: CPT

## 2022-07-04 PROCEDURE — 86225 DNA ANTIBODY NATIVE: CPT

## 2022-07-04 PROCEDURE — 2580000003 HC RX 258: Performed by: INTERNAL MEDICINE

## 2022-07-04 PROCEDURE — 82570 ASSAY OF URINE CREATININE: CPT

## 2022-07-04 PROCEDURE — 6360000002 HC RX W HCPCS: Performed by: STUDENT IN AN ORGANIZED HEALTH CARE EDUCATION/TRAINING PROGRAM

## 2022-07-04 PROCEDURE — 2580000003 HC RX 258: Performed by: STUDENT IN AN ORGANIZED HEALTH CARE EDUCATION/TRAINING PROGRAM

## 2022-07-04 PROCEDURE — 84300 ASSAY OF URINE SODIUM: CPT

## 2022-07-04 PROCEDURE — 6370000000 HC RX 637 (ALT 250 FOR IP): Performed by: STUDENT IN AN ORGANIZED HEALTH CARE EDUCATION/TRAINING PROGRAM

## 2022-07-04 PROCEDURE — 99223 1ST HOSP IP/OBS HIGH 75: CPT | Performed by: INTERNAL MEDICINE

## 2022-07-04 PROCEDURE — 2500000003 HC RX 250 WO HCPCS: Performed by: INTERNAL MEDICINE

## 2022-07-04 PROCEDURE — 80048 BASIC METABOLIC PNL TOTAL CA: CPT

## 2022-07-04 PROCEDURE — 2060000000 HC ICU INTERMEDIATE R&B

## 2022-07-04 PROCEDURE — 83880 ASSAY OF NATRIURETIC PEPTIDE: CPT

## 2022-07-04 PROCEDURE — 99253 IP/OBS CNSLTJ NEW/EST LOW 45: CPT | Performed by: INTERNAL MEDICINE

## 2022-07-04 PROCEDURE — 83735 ASSAY OF MAGNESIUM: CPT

## 2022-07-04 PROCEDURE — 6370000000 HC RX 637 (ALT 250 FOR IP)

## 2022-07-04 PROCEDURE — 85379 FIBRIN DEGRADATION QUANT: CPT

## 2022-07-04 PROCEDURE — 80076 HEPATIC FUNCTION PANEL: CPT

## 2022-07-04 PROCEDURE — C9113 INJ PANTOPRAZOLE SODIUM, VIA: HCPCS | Performed by: STUDENT IN AN ORGANIZED HEALTH CARE EDUCATION/TRAINING PROGRAM

## 2022-07-04 PROCEDURE — 83690 ASSAY OF LIPASE: CPT

## 2022-07-04 PROCEDURE — 6360000002 HC RX W HCPCS

## 2022-07-04 PROCEDURE — A4216 STERILE WATER/SALINE, 10 ML: HCPCS | Performed by: STUDENT IN AN ORGANIZED HEALTH CARE EDUCATION/TRAINING PROGRAM

## 2022-07-04 PROCEDURE — 84484 ASSAY OF TROPONIN QUANT: CPT

## 2022-07-04 PROCEDURE — 99285 EMERGENCY DEPT VISIT HI MDM: CPT

## 2022-07-04 PROCEDURE — 82330 ASSAY OF CALCIUM: CPT

## 2022-07-04 PROCEDURE — 93005 ELECTROCARDIOGRAM TRACING: CPT | Performed by: STUDENT IN AN ORGANIZED HEALTH CARE EDUCATION/TRAINING PROGRAM

## 2022-07-04 PROCEDURE — 36415 COLL VENOUS BLD VENIPUNCTURE: CPT

## 2022-07-04 PROCEDURE — 80307 DRUG TEST PRSMV CHEM ANLYZR: CPT

## 2022-07-04 PROCEDURE — 86038 ANTINUCLEAR ANTIBODIES: CPT

## 2022-07-04 PROCEDURE — 51798 US URINE CAPACITY MEASURE: CPT

## 2022-07-04 PROCEDURE — 86160 COMPLEMENT ANTIGEN: CPT

## 2022-07-04 PROCEDURE — 96374 THER/PROPH/DIAG INJ IV PUSH: CPT

## 2022-07-04 PROCEDURE — 83883 ASSAY NEPHELOMETRY NOT SPEC: CPT

## 2022-07-04 PROCEDURE — 84156 ASSAY OF PROTEIN URINE: CPT

## 2022-07-04 PROCEDURE — 71045 X-RAY EXAM CHEST 1 VIEW: CPT

## 2022-07-04 PROCEDURE — 85025 COMPLETE CBC W/AUTO DIFF WBC: CPT

## 2022-07-04 PROCEDURE — 81001 URINALYSIS AUTO W/SCOPE: CPT

## 2022-07-04 PROCEDURE — 96375 TX/PRO/DX INJ NEW DRUG ADDON: CPT

## 2022-07-04 RX ORDER — ACETAMINOPHEN 650 MG/1
650 SUPPOSITORY RECTAL EVERY 6 HOURS PRN
Status: DISCONTINUED | OUTPATIENT
Start: 2022-07-04 | End: 2022-07-05 | Stop reason: HOSPADM

## 2022-07-04 RX ORDER — ACETAMINOPHEN 325 MG/1
650 TABLET ORAL EVERY 6 HOURS PRN
Status: DISCONTINUED | OUTPATIENT
Start: 2022-07-04 | End: 2022-07-05 | Stop reason: HOSPADM

## 2022-07-04 RX ORDER — HEPARIN SODIUM 5000 [USP'U]/ML
5000 INJECTION, SOLUTION INTRAVENOUS; SUBCUTANEOUS EVERY 8 HOURS SCHEDULED
Status: DISCONTINUED | OUTPATIENT
Start: 2022-07-04 | End: 2022-07-05 | Stop reason: HOSPADM

## 2022-07-04 RX ORDER — ONDANSETRON 2 MG/ML
4 INJECTION INTRAMUSCULAR; INTRAVENOUS EVERY 6 HOURS PRN
Status: DISCONTINUED | OUTPATIENT
Start: 2022-07-04 | End: 2022-07-05 | Stop reason: HOSPADM

## 2022-07-04 RX ORDER — 0.9 % SODIUM CHLORIDE 0.9 %
1000 INTRAVENOUS SOLUTION INTRAVENOUS ONCE
Status: COMPLETED | OUTPATIENT
Start: 2022-07-04 | End: 2022-07-04

## 2022-07-04 RX ORDER — TRAZODONE HYDROCHLORIDE 50 MG/1
50 TABLET ORAL NIGHTLY PRN
Status: DISCONTINUED | OUTPATIENT
Start: 2022-07-04 | End: 2022-07-05 | Stop reason: HOSPADM

## 2022-07-04 RX ORDER — ONDANSETRON 2 MG/ML
4 INJECTION INTRAMUSCULAR; INTRAVENOUS ONCE
Status: COMPLETED | OUTPATIENT
Start: 2022-07-04 | End: 2022-07-04

## 2022-07-04 RX ORDER — SODIUM CHLORIDE 9 MG/ML
INJECTION, SOLUTION INTRAVENOUS PRN
Status: DISCONTINUED | OUTPATIENT
Start: 2022-07-04 | End: 2022-07-05 | Stop reason: HOSPADM

## 2022-07-04 RX ORDER — SODIUM CHLORIDE 0.9 % (FLUSH) 0.9 %
5-40 SYRINGE (ML) INJECTION EVERY 12 HOURS SCHEDULED
Status: DISCONTINUED | OUTPATIENT
Start: 2022-07-04 | End: 2022-07-05 | Stop reason: HOSPADM

## 2022-07-04 RX ORDER — CALCIUM GLUCONATE 20 MG/ML
2000 INJECTION, SOLUTION INTRAVENOUS ONCE
Status: COMPLETED | OUTPATIENT
Start: 2022-07-04 | End: 2022-07-04

## 2022-07-04 RX ORDER — FAMOTIDINE 20 MG/1
20 TABLET, FILM COATED ORAL ONCE
Status: COMPLETED | OUTPATIENT
Start: 2022-07-04 | End: 2022-07-04

## 2022-07-04 RX ORDER — ESCITALOPRAM OXALATE 10 MG/1
10 TABLET ORAL DAILY
Status: DISCONTINUED | OUTPATIENT
Start: 2022-07-04 | End: 2022-07-05 | Stop reason: HOSPADM

## 2022-07-04 RX ORDER — PHENYTOIN 50 MG/1
100 TABLET, CHEWABLE ORAL 3 TIMES DAILY
Status: DISCONTINUED | OUTPATIENT
Start: 2022-07-04 | End: 2022-07-05 | Stop reason: HOSPADM

## 2022-07-04 RX ORDER — POLYETHYLENE GLYCOL 3350 17 G/17G
17 POWDER, FOR SOLUTION ORAL DAILY PRN
Status: DISCONTINUED | OUTPATIENT
Start: 2022-07-04 | End: 2022-07-05 | Stop reason: HOSPADM

## 2022-07-04 RX ORDER — ACETAMINOPHEN 500 MG
1000 TABLET ORAL ONCE
Status: COMPLETED | OUTPATIENT
Start: 2022-07-04 | End: 2022-07-04

## 2022-07-04 RX ORDER — ONDANSETRON 4 MG/1
4 TABLET, ORALLY DISINTEGRATING ORAL EVERY 8 HOURS PRN
Status: DISCONTINUED | OUTPATIENT
Start: 2022-07-04 | End: 2022-07-05 | Stop reason: HOSPADM

## 2022-07-04 RX ORDER — SODIUM CHLORIDE 0.9 % (FLUSH) 0.9 %
5-40 SYRINGE (ML) INJECTION PRN
Status: DISCONTINUED | OUTPATIENT
Start: 2022-07-04 | End: 2022-07-05 | Stop reason: HOSPADM

## 2022-07-04 RX ADMIN — ACETAMINOPHEN 1000 MG: 500 TABLET ORAL at 09:22

## 2022-07-04 RX ADMIN — SODIUM BICARBONATE: 84 INJECTION, SOLUTION INTRAVENOUS at 14:10

## 2022-07-04 RX ADMIN — ONDANSETRON 4 MG: 2 INJECTION INTRAMUSCULAR; INTRAVENOUS at 09:21

## 2022-07-04 RX ADMIN — SODIUM BICARBONATE: 84 INJECTION, SOLUTION INTRAVENOUS at 22:50

## 2022-07-04 RX ADMIN — SODIUM CHLORIDE 1000 ML: 9 INJECTION, SOLUTION INTRAVENOUS at 09:22

## 2022-07-04 RX ADMIN — PHENYTOIN 100 MG: 50 TABLET, CHEWABLE ORAL at 20:19

## 2022-07-04 RX ADMIN — SODIUM CHLORIDE 40 MG: 9 INJECTION, SOLUTION INTRAMUSCULAR; INTRAVENOUS; SUBCUTANEOUS at 09:22

## 2022-07-04 RX ADMIN — FAMOTIDINE 20 MG: 20 TABLET, FILM COATED ORAL at 09:22

## 2022-07-04 RX ADMIN — TRAZODONE HYDROCHLORIDE 50 MG: 50 TABLET ORAL at 20:19

## 2022-07-04 RX ADMIN — HEPARIN SODIUM 5000 UNITS: 5000 INJECTION INTRAVENOUS; SUBCUTANEOUS at 22:33

## 2022-07-04 RX ADMIN — CALCIUM GLUCONATE 2000 MG: 20 INJECTION, SOLUTION INTRAVENOUS at 11:06

## 2022-07-04 RX ADMIN — SODIUM CHLORIDE 1000 ML: 9 INJECTION, SOLUTION INTRAVENOUS at 10:49

## 2022-07-04 ASSESSMENT — PAIN DESCRIPTION - ORIENTATION: ORIENTATION: MID;UPPER

## 2022-07-04 ASSESSMENT — ENCOUNTER SYMPTOMS
BACK PAIN: 0
EYE DISCHARGE: 0
TROUBLE SWALLOWING: 0
VOMITING: 1
SHORTNESS OF BREATH: 0
COUGH: 0
EYE ITCHING: 0
APNEA: 0
COLOR CHANGE: 0
BLOOD IN STOOL: 0
ABDOMINAL DISTENTION: 1
ABDOMINAL PAIN: 1
CHEST TIGHTNESS: 1
NAUSEA: 1
EYE PAIN: 0
DIARRHEA: 1
VOICE CHANGE: 0

## 2022-07-04 ASSESSMENT — PAIN SCALES - GENERAL
PAINLEVEL_OUTOF10: 0
PAINLEVEL_OUTOF10: 7
PAINLEVEL_OUTOF10: 0
PAINLEVEL_OUTOF10: 7

## 2022-07-04 ASSESSMENT — PAIN - FUNCTIONAL ASSESSMENT: PAIN_FUNCTIONAL_ASSESSMENT: 0-10

## 2022-07-04 ASSESSMENT — PAIN DESCRIPTION - LOCATION: LOCATION: ABDOMEN

## 2022-07-04 ASSESSMENT — PAIN DESCRIPTION - FREQUENCY: FREQUENCY: CONTINUOUS

## 2022-07-04 ASSESSMENT — PAIN DESCRIPTION - DESCRIPTORS: DESCRIPTORS: CRAMPING

## 2022-07-04 NOTE — H&P
[address]     Department of Internal Medicine - Staff Internal Medicine Teaching Service          ADMISSION NOTE/HISTORY AND PHYSICAL EXAMINATION   Date: 7/4/2022  Patient Name: Gerardo Olmedo  Date of admission: 7/4/2022  8:54 AM  YOB: 1965  PCP: No primary care provider on file. History Obtained From:  patient, electronic medical record    CHIEF COMPLAINT     Chief complaint: Vomiting and Abdominal Pain    HISTORY OF PRESENTING ILLNESS     The patient is a pleasant 49 y.o. male with past medical history of seizures, HTN, DM, MI x2, multiple traumas s/p splenectomy. presents  to the ED with a chief complaint of abdominal pain with vomiting    The patient stated that yesterday he had taken some crack cocaine and then he started having abdominal pain and profuse vomiting, yellow and watery with no food contents for first time. He also states that later he then took 2 sips of a beer and had more vomiting, but this time there was some dark red blood that he noticed and he came to the ED. Before yesterday, the patient stated that he was sober from crack cocaine and alcohol for about 6 months. The patient states that the abdominal pain is migratory and intermittent, he says that it starts in one quadrant and then once that area starts feeling better he would feel it starting in another area. Patient also reported a history of frequent NSAID use for his chronic right knee pain. The patient has been non-compliant medication for his HTN or DM, and did not follow up with cardiology after his MI x2. Pt denies dysuria, hematuria, urinary urgency, or urinary hesitancy. Review of Systems   Gastrointestinal: Positive for abdominal pain and diarrhea.         Review of Systems:    General ROS: Completed and except as mentioned above were negative   HEENT ROS: Completed and except as mentioned above were negative   Allergy and Immunology ROS:  Completed and except as mentioned above were negative  Hematological and Lymphatic ROS:  Completed and except as mentioned above were negative  Respiratory ROS:  Completed and except as mentioned above were negative  Cardiovascular ROS:  Completed and except as mentioned above were negative  Gastrointestinal ROS: Completed and except as mentioned above were negative  Genito-Urinary ROS:  Completed and except as mentioned above were negative  Musculoskeletal ROS:  Completed and except as mentioned above were negative  Neurological ROS:  Completed and except as mentioned above were negative  Skin & Dermatological ROS:  Completed and except as mentioned above were negative  Psychological ROS:  Completed and except as mentioned above were negative      Initial Vitals on presentation :   Temp: 98, HR: 96, RR 17, /88, SPO2 95    Initial Course in the ED:   Patient was found to have significant SYLVIA with low calcium. He was started on IV fluids and given calcium replacement. An EKG and chest xRay were order, but they showed nothing specific. Nephrology and IM were consulted. Significant Labs :    BUN: 64, Cr: 4.04, Trop 24 , K+: 5.5     Treatment in ED :    Patient received 2 NS bolus, acetaminaphen, and 2000mg calcium gluconate    PAST MEDICAL HISTORY     Past Medical History:   Diagnosis Date    Seizures ([facility] [address]        PAST SURGICAL HISTORY     History reviewed. No pertinent surgical history. ALLERGIES     Pcn [penicillins]    MEDICATIONS PRIOR TO ADMISSION     Prior to Admission medications    Medication Sig Start Date End Date Taking?  Authorizing Provider   phenytoin (DILANTIN) 50 MG tablet Take 2 tablets by mouth 3 times daily 11/25/21   Christiano Bush MD   escitalopram (LEXAPRO) 10 MG tablet Take 1 tablet by mouth daily 11/26/21   Christiano Bush MD   traZODone (DESYREL) 50 MG tablet Take 1 tablet by mouth nightly as needed for Sleep 11/25/21   Christiano Bush MD       SOCIAL HISTORY     Tobacco:   Social History     Tobacco Use   Smoking Status Never Smoker   Smokeless Tobacco Never Used      Alcohol: sober for multiple months, relapsed yesterday  Illicits: Crack Cocaine, sober for multiple months, relapsed yesterday    FAMILY HISTORY     History reviewed. No pertinent family history. PHYSICAL EXAM     Vitals: /81   Pulse (!) 104   Temp 98.4 °F (36.9 °C) (Oral)   Resp 19   Ht 5' 10\" (1.778 m)   Wt 182 lb 15.7 oz (83 kg)   SpO2 90%   BMI 26.26 kg/m²   Tmax: Temp (24hrs), Av.2 °F (36.8 °C), Min:98 °F (36.7 °C), Max:98.4 °F (36.9 °C)    Last Body weight:   Wt Readings from Last 3 Encounters:   22 182 lb 15.7 oz (83 kg)   21 162 lb 4.1 oz (73.6 kg)   10/13/18 185 lb (83.9 kg)     Body Mass Index : Body mass index is 26.26 kg/m². PHYSICAL EXAMINATION:    Constitutional: This is a well developed, well nourished, 25-29.9 - Overweight 49y.o. year old male who is alert, oriented, cooperative and in no apparent distress. Head: Atraumatic and Normocephalic   EENT:  PERRLA. No conjunctival injections. Septum was midline, mucosa was without erythema, exudates or cobblestoning. No thrush was noted. Neck: Supple without thyromegaly. No elevated JVP. Trachea was midline. Respiratory: Chest was symmetrical without dullness to percussion. Breath sounds bilaterally were clear to auscultation. There were no wheezes, rhonchi or rales. There is no intercostal retraction or use of accessory muscles. Cardiovascular: Regular without murmur, clicks, gallops or rubs. Abdomen: Slightly rounded and soft without organomegaly. No rebound, rigidity or guarding was appreciated. LLQ tenderness to light and deep palpation, RLQ tenderness to light palpation, CVA tenderness on left  Lymphatic: No lymphadenopathy. Musculoskeletal: Normal curvature of the spine. No gross muscle weakness. Extremities:  No lower extremity edema, ulcerations, tenderness, varicosities or erythema.   Muscle size, tone and strength are normal.  No involuntary movements are noted. Skin:  Warm and dry. Good color, turgor and pigmentation. No lesions or scars.   No cyanosis or clubbing  Neurological/Psychiatric: The patient's general behavior, level of consciousness, thought content and emotional status is normal.          INVESTIGATIONS     Laboratory Testing:     Recent Results (from the past 24 hour(s))   CBC with Auto Differential    Collection Time: 07/04/22  9:24 AM   Result Value Ref Range    WBC 11.5 (H) 3.5 - 11.3 k/uL    RBC 6.66 (H) 4.21 - 5.77 m/uL    Hemoglobin 15.9 13.0 - 17.0 g/dL    Hematocrit 52.4 (H) 40.7 - 50.3 %    MCV 78.7 (L) 82.6 - 102.9 fL    MCH 23.9 (L) 25.2 - 33.5 pg    MCHC 30.3 28.4 - 34.8 g/dL    RDW 16.9 (H) 11.8 - 14.4 %    Platelets 732 867 - 750 k/uL    MPV 9.7 8.1 - 13.5 fL    NRBC Automated 0.0 0.0 per 100 WBC    Seg Neutrophils 70 (H) 36 - 65 %    Lymphocytes 23 (L) 24 - 43 %    Monocytes 7 3 - 12 %    Eosinophils % 0 (L) 1 - 4 %    Basophils 0 0 - 2 %    Immature Granulocytes 0 0 %    Segs Absolute 7.94 1.50 - 8.10 k/uL    Absolute Lymph # 2.66 1.10 - 3.70 k/uL    Absolute Mono # 0.80 0.10 - 1.20 k/uL    Absolute Eos # <0.03 0.00 - 0.44 k/uL    Basophils Absolute <0.03 0.00 - 0.20 k/uL    Absolute Immature Granulocyte 0.03 0.00 - 0.30 k/uL    RBC Morphology ANISOCYTOSIS PRESENT    Hepatic Function Panel    Collection Time: 07/04/22  9:24 AM   Result Value Ref Range    Albumin 5.5 (H) 3.5 - 5.2 g/dL    Alkaline Phosphatase 121 40 - 129 U/L    ALT 36 5 - 41 U/L    AST 75 (H) <40 U/L    Total Bilirubin 0.74 0.3 - 1.2 mg/dL    Bilirubin, Direct 0.10 <0.31 mg/dL    Bilirubin, Indirect 0.64 0.00 - 1.00 mg/dL    Total Protein 9.7 (H) 6.4 - 8.3 g/dL    Albumin/Globulin Ratio 1.3 1.0 - 2.5   Lipase    Collection Time: 07/04/22  9:24 AM   Result Value Ref Range    Lipase 9 (L) 13 - 60 U/L   Troponin    Collection Time: 07/04/22  9:24 AM   Result Value Ref Range    Troponin, High Sensitivity 24 (H) 0 - 22 ng/L   Brain Natriuretic Peptide    Collection Time: 07/04/22  9:24 AM   Result Value Ref Range    Pro- (H) <300 pg/mL   Basic Metabolic Panel    Collection Time: 07/04/22  9:24 AM   Result Value Ref Range    Glucose 129 (H) 70 - 99 mg/dL    BUN 64 (H) 6 - 20 mg/dL    CREATININE 4.04 (H) 0.70 - 1.20 mg/dL    Calcium 9.8 8.6 - 10.4 mg/dL    Sodium 143 135 - 144 mmol/L    Potassium 5.5 (H) 3.7 - 5.3 mmol/L    Chloride 96 (L) 98 - 107 mmol/L    CO2 18 (L) 20 - 31 mmol/L    Anion Gap 29 (H) 9 - 17 mmol/L    GFR Non-African American 16 (L) >60 mL/min    GFR  19 (L) >60 mL/min    GFR Comment         Magnesium    Collection Time: 07/04/22  9:24 AM   Result Value Ref Range    Magnesium 2.8 (H) 1.6 - 2.6 mg/dL   C3 Complement    Collection Time: 07/04/22  9:24 AM   Result Value Ref Range    Complement C3 212 (H) 90 - 180 mg/dL   C4 Complement    Collection Time: 07/04/22  9:24 AM   Result Value Ref Range    Complement C4 38 10 - 40 mg/dL   D-Dimer, Quantitative    Collection Time: 07/04/22  9:54 AM   Result Value Ref Range    D-Dimer, Quant 0.20 mg/L FEU   Calcium, Ionized    Collection Time: 07/04/22 10:13 AM   Result Value Ref Range    Calcium, Ion 1.06 (L) 1.13 - 1.33 mmol/L   Troponin    Collection Time: 07/04/22 10:13 AM   Result Value Ref Range    Troponin, High Sensitivity 20 0 - 22 ng/L   Sodium, urine, random    Collection Time: 07/04/22  2:16 PM   Result Value Ref Range    Sodium,Ur 58 mmol/L   Urinalysis    Collection Time: 07/04/22  2:16 PM   Result Value Ref Range    Color, UA Yellow Yellow    Turbidity UA Clear Clear    Glucose, Ur NEGATIVE NEGATIVE    Bilirubin Urine NEGATIVE NEGATIVE    Ketones, Urine SMALL (A) NEGATIVE    Specific Gravity, UA 1.025 1.005 - 1.030    Urine Hgb TRACE (A) NEGATIVE    pH, UA 5.0 5.0 - 8.0    Protein, UA TRACE (A) NEGATIVE    Urobilinogen, Urine Normal Normal    Nitrite, Urine NEGATIVE NEGATIVE    Leukocyte Esterase, Urine NEGATIVE NEGATIVE   Protein, urine, random    Collection Time: 07/04/22  2:16 PM   Result Value Ref Range    Total Protein, Urine 19 mg/dL   Creatinine, Random Urine    Collection Time: 07/04/22  2:16 PM   Result Value Ref Range    Creatinine, Ur 231.5 39.0 - 259.0 mg/dL   Urine Drug Screen    Collection Time: 07/04/22  2:16 PM   Result Value Ref Range    Amphetamine Screen, Ur NEGATIVE NEGATIVE    Barbiturate Screen, Ur NEGATIVE NEGATIVE    Benzodiazepine Screen, Urine NEGATIVE NEGATIVE    Cocaine Metabolite, Urine POSITIVE (A) NEGATIVE    Methadone Screen, Urine NEGATIVE NEGATIVE    Opiates, Urine NEGATIVE NEGATIVE    Phencyclidine, Urine NEGATIVE NEGATIVE    Cannabinoid Scrn, Ur NEGATIVE NEGATIVE    Oxycodone Screen, Ur NEGATIVE NEGATIVE    Test Information       Assay provides medical screening only. The absence of expected drug(s) and/or metabolite(s) may indicate diluted or adulterated urine, limitations of testing or timing of collection. Microscopic Urinalysis    Collection Time: 07/04/22  2:16 PM   Result Value Ref Range    -          WBC, UA 2 TO 5 0 - 5 /HPF    RBC, UA 5 TO 10 0 - 4 /HPF    Casts UA  0 - 8 /LPF     10 TO 20 HYALINE Reference range defined for non-centrifuged specimen.     Epithelial Cells UA 0 TO 2 0 - 5 /HPF    Bacteria, UA FEW (A) None   Kappa/Lambda Quantitative Free Light Chains, Serum    Collection Time: 07/04/22  4:49 PM   Result Value Ref Range    Kappa Free Light Chains QNT 1.77 0.37 - 1.94 mg/dL    Lambda Free Light Chains QNT 1.36 0.57 - 2.63 mg/dL    Free Kappa/Lambda Ratio 1.30 0.26 - 7.67   Basic Metabolic Panel    Collection Time: 07/04/22  4:49 PM   Result Value Ref Range    Glucose 148 (H) 70 - 99 mg/dL    BUN 55 (H) 6 - 20 mg/dL    CREATININE 2.18 (H) 0.70 - 1.20 mg/dL    Calcium 8.5 (L) 8.6 - 10.4 mg/dL    Sodium 141 135 - 144 mmol/L    Potassium 4.4 3.7 - 5.3 mmol/L    Chloride 104 98 - 107 mmol/L    CO2 20 20 - 31 mmol/L    Anion Gap 17 9 - 17 mmol/L    GFR Non-African American 32 (L) >60 mL/min    GFR  38 (L) >60 mL/min    GFR Comment             Imaging:   XR CHEST PORTABLE    Result Date: 7/4/2022  1. Mildly prominent cardiac silhouette. 2. No focal consolidation or overt pulmonary edema. ASSESSMENT & PLAN     ASSESSMENT / PLAN:     IMPRESSION    This is a 49 y.o. male who presented with abdominal pain and vomiting and found to have an SYLVIA. Patient admitted to inpatient status for management of SYLVIA and electrolyte disturbances    Principal Problem:    Acute kidney injury ([address]  Active Problems:    Hyperkalemia    Metabolic acidosis    Non-compliance    HTN (hypertension)    Alcohol abuse    History of seizures-takes Dilantin    Cocaine use  Resolved Problems:    * No resolved hospital problems. *        Acute Kidney Injury  BUN: 64, Cr: 4.04  Likely Prerenal  On IV bicarb fluids. Will rule out postrenal by kidney ultrasound  Renal Diet  Monitor I&Os      Metabolic Acidosis  On IV bicarb  Monitor CMP      Hyperkalemia  Likely due to metabolic acidosis  Monitor potassium      History of seizures  Restart home dose of dilantin      Cocaine use  Toxicology Cocaine+  Abstinence discussed      Nausea/Vomiting  Symptom management    DVT ppx: Heparin  GI ppx: Protonix  PT/OT/SW: consulted  Discharge Planning: CM to assist with discharge Gretchen Dupont MD  Internal Medicine Resident, [facility]; [place], [place]  7/4/2022, 7:35 PM    Attestation and add on       I have discussed the care of Tanisha Abarca , including pertinent history and exam findings,     7/04/22    with the resident. I have seen and examined the patient and the key elements of all parts of the encounter have been performed by me . I agree with the assessment, plan and orders as documented by the resident.     ---- ;     MD MARCIA HumphreysBates County Memorial Hospital  [address] [place] [address], [address] [place] [address].    Phone [phone]   Fax: [phone]  Answering Service: [phone]

## 2022-07-04 NOTE — ED PROVIDER NOTES
[facility]  Emergency Department  Faculty Attestation     I performed a history and physical examination of the patient and discussed management with the resident. I reviewed the residents note and agree with the documented findings and plan of care. Any areas of disagreement are noted on the chart. I was personally present for the key portions of any procedures. I have documented in the chart those procedures where I was not present during the key portions. I have reviewed the emergency nurses triage note. I agree with the chief complaint, past medical history, past surgical history, allergies, medications, social and family history as documented unless otherwise noted below. For Physician Assistant/ Nurse Practitioner cases/documentation I have personally evaluated this patient and have completed at least one if not all key elements of the E/M (history, physical exam, and MDM). Additional findings are as noted. Primary Care Physician:  No primary care provider on file. Screenings:  [unfilled]    CHIEF COMPLAINT       Chief Complaint   Patient presents with    Abdominal Pain       RECENT VITALS:    ,   , Resp: 18,      LABS:  Labs Reviewed - No data to display    Radiology  No orders to display       CRITICAL CARE: There was a high probability of clinically significant/life threatening deterioration in this patient's condition which required my urgent intervention. Total critical care time was 5 minutes. This excludes any time for separately reportable procedures.      EKG:   EKG Interpretation    Interpreted by me    Rhythm: normal sinus   Rate: Tachycardia  Axis: Left l  Ectopy: none  Conduction: Borderline long QT  ST Segments: no acute change  T Waves: no acute change  Q Waves: none    Clinical Impression: Tachycardia, no acute ischemic changes    Attending Physician Additional  Notes    Patient has a variety of complaints including vomiting clear liquid that then became bright red blood. He denies abdominal pain. He tried drinking just 1 beer and it made him vomit. He is also having diarrhea now but no blood or melena. He has intermittent episodes of severe spasm-like pain in a variety of places including his left chest, upper abdomen, right arm, lasting 5 minutes or so at a time. No fevers. He denies difficulty breathing cough sputum hemoptysis. No leg pain calf swelling mobility or risk factors for PE. States he has a cardiac history with 2 prior MIs but does not have medications for this and does not have a cardiologist.  No prior endoscopy per his history. He has had prior exploratory laparotomy with splenectomy and previous appendectomy. On exam is tachycardic, no respiratory distress but saturation is 91-92% on room air. Lungs are clear. Abdomen has midline scar there is no tenderness. He has positive Chvostek sign, probable positive Trousseau sign. Normal pulses. No edema cords Homans or calf tenderness. Cardiac exam is benign. Impression is vomiting, likely Noy-Sullivan tear but consider peptic ulcer disease/GI bleeding, concern for hypocalcium Maryam or hypomagnesemia, exclude ACS, hypoxemia and tachycardia concerning for pneumonia versus pulmonary embolism. Plan is IV access, antiemetics, analgesics, troponins, D-dimer, chest x-ray, hemoglobin, type and screen, Protonix, anticipate use of magnesium or calcium infusion, reassess, anticipate admission. Lorrin Kayser.  Shaista Zamudio MD, [address]  Attending Emergency  Physician               Rosemarie Babcock MD  07/04/22 0098
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
## 2022-07-04 NOTE — CONSULTS
Nephrology Consult Note    Reason for Consult: SYLVIA  Requesting Physician: Dr. Modesto Vera    Chief Complaint:  Abdominal pain    History Obtained From:  Patient, EMR, RN    History of Present Illness: This is a 49 y.o. male who presented to the hospital for evaluation of abdominal pain. N/v, and started with diarrhea today. Patient has been sober from etoh, crack cocaine use for several months. Decided to use crack cocaine given that it is the holiday weekend. Developed upper epigastric to LUQ abd pain yesterday am, then profuse vomiting, leading to eventually some vomiting with streaks of blood. This scared him and he presented to the ED. Also, yesterday he tried to drink a beer, but this caused him to have \"more vomiting\". Today he is tolerating a diet, but has diarrhea, no melena or hematochezia. Takes OTC NSAIDs every other day to daily for his chronic right knee pain. Hx seizures, hx HTN, does not take any prescribed meds for such in a while. Denies vision changes, no HA, notices has had not urinated much yesterday. Had 500cc urine out in ED/overnight. Denies dysuria, hematuria. Nephrology was asked to see him for SYLVIA Cr is 4.04 at admission BUN 64 Na 143 K 5.5 CO2 18, ionized calcium 1.06, glucose 129, Mg 2.8    AST 75 ALT36    Baseline Cr appears normal. Denies known hx of chronic renal disease, no family hx of such. Was given 2L NS bolus's in ED. Patient admits to a hx of heavy or prolonged NSAID use. There is no history of blood or bone marrow disorders. There is no hx of jaundice or hepatitis or sexually transmitted disease. Pt has no hx of collagen vascular disease or vasculitis. No history of dysuria or frequency. No recent procedures involving IV contrast.   There is no hx of paraprotein disease. No hx of recurrent UTI , incontinence or recurrent nephrolithiasis. Past Medical History:        Diagnosis Date    Seizures [facility])        Past Surgical History:    History reviewed. No pertinent surgical history. Current Medications:    pantoprazole (PROTONIX) 40 mg in sodium chloride (PF) 10 mL injection, Daily  sodium chloride flush 0.9 % injection 5-40 mL, 2 times per day  sodium chloride flush 0.9 % injection 5-40 mL, PRN  0.9 % sodium chloride infusion, PRN  ondansetron (ZOFRAN-ODT) disintegrating tablet 4 mg, Q8H PRN   Or  ondansetron (ZOFRAN) injection 4 mg, Q6H PRN  polyethylene glycol (GLYCOLAX) packet 17 g, Daily PRN  acetaminophen (TYLENOL) tablet 650 mg, Q6H PRN   Or  acetaminophen (TYLENOL) suppository 650 mg, Q6H PRN  heparin (porcine) injection 5,000 Units, 3 times per day  sodium bicarbonate 75 mEq in sodium chloride 0.45 % 1,000 mL infusion, Continuous        Allergies:  Pcn [penicillins]    Social History:   Social History     Socioeconomic History    Marital status: Single     Spouse name: Not on file    Number of children: Not on file    Years of education: Not on file    Highest education level: Not on file   Occupational History    Not on file   Tobacco Use    Smoking status: Never Smoker    Smokeless tobacco: Never Used   Substance and Sexual Activity    Alcohol use: No    Drug use: No    Sexual activity: Not on file   Other Topics Concern    Not on file   Social History Narrative    Not on file     Social Determinants of Health     Financial Resource Strain:     Difficulty of Paying Living Expenses: Not on file   Food Insecurity:     Worried About Running Out of Food in the Last Year: Not on file    Ervin of Food in the Last Year: Not on file   Transportation Needs:     Lack of Transportation (Medical): Not on file    Lack of Transportation (Non-Medical):  Not on file   Physical Activity:     Days of Exercise per Week: Not on file    Minutes of Exercise per Session: Not on file   Stress:     Feeling of Stress : Not on file   Social Connections:     Frequency of Communication with Friends and Family: Not on file    Frequency of Social Gatherings with Friends and Family: Not on file    Attends Catholic Services: Not on file    Active Member of Clubs or Organizations: Not on file    Attends Club or Organization Meetings: Not on file    Marital Status: Not on file   Intimate Partner Violence:     Fear of Current or Ex-Partner: Not on file    Emotionally Abused: Not on file    Physically Abused: Not on file    Sexually Abused: Not on file   Housing Stability:     Unable to Pay for Housing in the Last Year: Not on file    Number of [place] in the Last Year: Not on file    Unstable Housing in the Last Year: Not on file       Family History:   History reviewed. No pertinent family history. Review of Systems:    Constitutional: No fever, no chills, no lethargy, no weakness. HEENT:  No headache, otalgia, itchy eyes, nasal discharge or sore throat. Cardiac:  No chest pain, dyspnea, orthopnea or PND. Chest:              No cough, phlegm or wheezing. Abdomen:  + abdominal pain, +nausea +vomiting + diarrhea  Neuro:  No focal weakness, abnormal movements orseizure like activity. Skin:   No rashes, no itching. :   No hematuria, no pyuria, no dysuria, no flank pain. Extremities:  No swelling + chronic right knee joint pains.       Objective:  CURRENT TEMPERATURE:  Temp: 98 °F (36.7 °C)  MAXIMUM TEMPERATURE OVER 24HRS:  Temp (24hrs), Av.1 °F (36.7 °C), Min:98 °F (36.7 °C), Max:98.1 °F (36.7 °C)    CURRENT RESPIRATORY RATE:  Resp: 17  CURRENT PULSE:  Heart Rate: 96  CURRENT BLOOD PRESSURE:  BP: 132/88  24HR BLOOD PRESSURE RANGE:  Systolic (34CKM), FGK:158 , Min:132 , AZR:307   ; Diastolic (20KQU), ELK:90, Min:88, Max:95    24HR INTAKE/OUTPUT:      Intake/Output Summary (Last 24 hours) at 2022 1407  Last data filed at 2022 1046  Gross per 24 hour   Intake 1000 ml   Output --   Net 1000 ml     Patient Vitals for the past 96 hrs (Last 3 readings):   Weight   22 1245 182 lb 15.7 oz (83 kg)     Physical Exam:  General appearance:Awake, alert, in no acute distress  Skin: warm and dry, no rash or erythema  Eyes: conjunctivae normal and sclera anicteric  ENT: :no thrush no pharyngeal congestion    Neck: no carotid bruit ,no  JVD,no carotid Lymphadenopathy, noThyromegaly   Pulmonary: no wheezing or rhonchi. No rales heard. Cardiovascular: Normal S1 & S2,  No S3 or  S4, no Pericardial Rub no Murmur   Abdomen: soft ND, ttp epigastrium to LUQ, no peritoneal signs +bs, no fluid wave  Extremities:no cyanosis, clubbing or edema    Labs:   CBC:  Recent Labs     07/04/22 0924   WBC 11.5*   RBC 6.66*   HGB 15.9   HCT 52.4*   MCV 78.7*   MCH 23.9*   MCHC 30.3   RDW 16.9*      MPV 9.7      BMP:   Recent Labs     07/04/22 0924      K 5.5*   CL 96*   CO2 18*   BUN 64*   CREATININE 4.04*   GLUCOSE 129*   CALCIUM 9.8        Phosphorus:  No results for input(s): PHOS in the last 72 hours. Magnesium:   Recent Labs     07/04/22 0924   MG 2.8*     Albumin:   Recent Labs     07/04/22 0924   LABALBU 5.5*       IRON:  No results found for: IRON  Iron Saturation:  No components found for: PERCENTFE  TIBC:  No results found for: TIBC  FERRITIN:  No results found for: FERRITIN  SPEP:   Lab Results   Component Value Date/Time    PROT 9.7 07/04/2022 09:24 AM     UPEP: No results found for: TPU     C3:   Lab Results   Component Value Date    C3 212 (H) 07/04/2022     C4:   Lab Results   Component Value Date    C4 38 07/04/2022     MPO ANCA:  No results found for: MPO .   PR3 ANCA:  No results found for: PR3  Urine Sodium:  No results found for: BUD   Urine Creatinine:  No results found for: LABCREA  Urine Eosinophils: No results found for: UREO  Urine Protein:  No results found for: TPU  Urine osm : No results found for: OSMOU  Urinalysis:  U/A:   Lab Results   Component Value Date/Time    NITRU NEGATIVE 11/15/2021 12:06 PM    COLORU Yellow 11/15/2021 12:06 PM    PHUR 5.0 11/15/2021 12:06 PM    WBCUA 10 TO 20 11/15/2021 12:06 PM    RBCUA 5 TO 10 11/15/2021 12:06 PM    MUCUS NOT REPORTED 11/15/2021 12:06 PM    TRICHOMONAS NOT REPORTED 11/15/2021 12:06 PM    YEAST NOT REPORTED 11/15/2021 12:06 PM    BACTERIA NOT REPORTED 11/15/2021 12:06 PM    SPECGRAV 1.017 11/15/2021 12:06 PM    LEUKOCYTESUR TRACE 11/15/2021 12:06 PM    UROBILINOGEN Normal 11/15/2021 12:06 PM    BILIRUBINUR NEGATIVE 11/15/2021 12:06 PM    GLUCOSEU NEGATIVE 11/15/2021 12:06 PM    KETUA SMALL 11/15/2021 12:06 PM    AMORPHOUS NOT REPORTED 11/15/2021 12:06 PM         Radiology:  Reviewed as available. Assessment:  1. SYLVIA non-oliguric suspect pre-renal related to GI losses, ATN from crack cocaine use, and commitant chronic NSAID use, baseline Cr appears normal.   2. Crack cocaine use  3. HYPERTENSION    4. Hx seizures  5. Hx alcohol abuse, reported sober 6 months, recent relapse  6. Psychiatric hx as well. 7. Metabolic acidosis  8. Hyperkalemia secondary to above. 9. N/V/D abdominal pain, hematemesis, n/v better suspect MWT, PUD, ischemia recommend PPI, stool micro r/o infectious etiologies if diarrhea persists. Also recommend CT a/p if GI symptoms persist without contrast.        Plan:  1. Will Check Renal Ultrasound to r/o element of obstruction and to assess the kidney size/echotexture. 2. Urine and serologic w/u as ordered  3. Start bicarb gtt at 150cc/hr. 5. Strict I/O daily weight, renal diet, avoid nephrotoxins. 6. BMP today at 1800, BMP in am.   7. Calcium gluconate given earlier today. 8. Avoid NSAIDs  Will discuss with Dr. Lily Potts  Thank you for the consultation. Please do not hesitate to call with questions. Electronically signed by ABHIJIT Cummings on 7/4/2022 at 2:07 PM  Attending Physician Statement  I have discussed the care of Addi Long, including pertinent history and exam findings,  with the CNP. I have reviewed the key elements of all parts of the encounter with the CNP. I agree with the assessment, plan and orders as documented.     Pato Roge James MD MD, MRCP [facility]   7/4/2022 3:13 PM    Nephrology [address] [place] [address] Drive

## 2022-07-04 NOTE — PLAN OF CARE
Problem: Discharge Planning  Goal: Discharge to home or other facility with appropriate resources  7/4/2022 1936 by Nitesh Chavarria RN  Outcome: Progressing  Flowsheets (Taken 7/4/2022 1431 by Armani Cao, RN)  Discharge to home or other facility with appropriate resources:   Identify barriers to discharge with patient and caregiver   Arrange for needed discharge resources and transportation as appropriate   Identify discharge learning needs (meds, wound care, etc)   Refer to discharge planning if patient needs post-hospital services based on physician order or complex needs related to functional status, cognitive ability or social support system  7/4/2022 1429 by Armani Cao, RN  Outcome: Progressing     Problem: Pain  Goal: Verbalizes/displays adequate comfort level or baseline comfort level  7/4/2022 1936 by Nitesh Chavarria RN  Outcome: Progressing  7/4/2022 1429 by Armani Cao, RN  Outcome: Progressing     Problem: Safety - Adult  Goal: Free from fall injury  7/4/2022 1936 by Nitesh Chavarria RN  Outcome: Progressing  7/4/2022 1429 by Armani Cao, RN  Outcome: Progressing

## 2022-07-05 ENCOUNTER — APPOINTMENT (OUTPATIENT)
Dept: ULTRASOUND IMAGING | Age: 57
DRG: 469 | End: 2022-07-05
Payer: MEDICARE

## 2022-07-05 VITALS
BODY MASS INDEX: 26.2 KG/M2 | HEART RATE: 71 BPM | SYSTOLIC BLOOD PRESSURE: 128 MMHG | HEIGHT: 70 IN | WEIGHT: 182.98 LBS | OXYGEN SATURATION: 96 % | DIASTOLIC BLOOD PRESSURE: 82 MMHG | TEMPERATURE: 98.4 F | RESPIRATION RATE: 16 BRPM

## 2022-07-05 LAB
-: ABNORMAL
ABSOLUTE EOS #: <0.03 K/UL (ref 0–0.44)
ABSOLUTE IMMATURE GRANULOCYTE: <0.03 K/UL (ref 0–0.3)
ABSOLUTE LYMPH #: 2.43 K/UL (ref 1.1–3.7)
ABSOLUTE MONO #: 0.43 K/UL (ref 0.1–1.2)
ALBUMIN SERPL-MCNC: 4.1 G/DL (ref 3.5–5.2)
ALBUMIN/GLOBULIN RATIO: 1.5 (ref 1–2.5)
ALP BLD-CCNC: 87 U/L (ref 40–129)
ALT SERPL-CCNC: 23 U/L (ref 5–41)
ANION GAP SERPL CALCULATED.3IONS-SCNC: 11 MMOL/L (ref 9–17)
ANTI DNA DOUBLE STRANDED: 1.1 IU/ML
ANTI-NUCLEAR ANTIBODY (ANA): NEGATIVE
AST SERPL-CCNC: 47 U/L
BASOPHILS # BLD: 1 % (ref 0–2)
BASOPHILS ABSOLUTE: 0.03 K/UL (ref 0–0.2)
BILIRUB SERPL-MCNC: 0.49 MG/DL (ref 0.3–1.2)
BILIRUBIN DIRECT: <0.08 MG/DL
BILIRUBIN URINE: NEGATIVE
BILIRUBIN, INDIRECT: ABNORMAL MG/DL (ref 0–1)
BUN BLDV-MCNC: 23 MG/DL (ref 6–20)
CALCIUM SERPL-MCNC: 8.6 MG/DL (ref 8.6–10.4)
CASTS UA: ABNORMAL /LPF (ref 0–2)
CASTS UA: ABNORMAL /LPF (ref 0–2)
CHLORIDE BLD-SCNC: 103 MMOL/L (ref 98–107)
CO2: 29 MMOL/L (ref 20–31)
COLOR: YELLOW
CREAT SERPL-MCNC: 1.04 MG/DL (ref 0.7–1.2)
CRYSTALS, UA: ABNORMAL /HPF
CRYSTALS, UA: ABNORMAL /HPF
EKG ATRIAL RATE: 109 BPM
EKG P AXIS: 51 DEGREES
EKG P-R INTERVAL: 144 MS
EKG Q-T INTERVAL: 354 MS
EKG QRS DURATION: 86 MS
EKG QTC CALCULATION (BAZETT): 476 MS
EKG T AXIS: 21 DEGREES
EKG VENTRICULAR RATE: 109 BPM
ENA ANTIBODIES SCREEN: 0.1 U/ML
EOSINOPHILS RELATIVE PERCENT: 0 % (ref 1–4)
EPITHELIAL CELLS UA: ABNORMAL /HPF (ref 0–5)
GFR AFRICAN AMERICAN: >60 ML/MIN
GFR NON-AFRICAN AMERICAN: >60 ML/MIN
GFR SERPL CREATININE-BSD FRML MDRD: ABNORMAL ML/MIN/{1.73_M2}
GLUCOSE BLD-MCNC: 95 MG/DL (ref 70–99)
GLUCOSE URINE: NEGATIVE
HCT VFR BLD CALC: 39.8 % (ref 40.7–50.3)
HEMOGLOBIN: 12.3 G/DL (ref 13–17)
IMMATURE GRANULOCYTES: 0 %
KETONES, URINE: NEGATIVE
LEUKOCYTE ESTERASE, URINE: NEGATIVE
LYMPHOCYTES # BLD: 46 % (ref 24–43)
MCH RBC QN AUTO: 24.6 PG (ref 25.2–33.5)
MCHC RBC AUTO-ENTMCNC: 30.9 G/DL (ref 28.4–34.8)
MCV RBC AUTO: 79.8 FL (ref 82.6–102.9)
MONOCYTES # BLD: 8 % (ref 3–12)
MYOGLOBIN: 148 NG/ML (ref 28–72)
NITRITE, URINE: NEGATIVE
NRBC AUTOMATED: 0 PER 100 WBC
PATHOLOGIST: NORMAL
PDW BLD-RTO: 15.1 % (ref 11.8–14.4)
PH UA: 6 (ref 5–8)
PLATELET # BLD: 194 K/UL (ref 138–453)
PMV BLD AUTO: 9.6 FL (ref 8.1–13.5)
POTASSIUM SERPL-SCNC: 4.1 MMOL/L (ref 3.7–5.3)
PROTEIN UA: ABNORMAL
RBC # BLD: 4.99 M/UL (ref 4.21–5.77)
RBC # BLD: ABNORMAL 10*6/UL
RBC UA: ABNORMAL /HPF (ref 0–2)
SEG NEUTROPHILS: 44 % (ref 36–65)
SEGMENTED NEUTROPHILS ABSOLUTE COUNT: 2.32 K/UL (ref 1.5–8.1)
SERUM IFX INTERP: NORMAL
SODIUM BLD-SCNC: 143 MMOL/L (ref 135–144)
SPECIFIC GRAVITY UA: 1.03 (ref 1–1.03)
TOTAL CK: 3484 U/L (ref 39–308)
TOTAL PROTEIN: 6.8 G/DL (ref 6.4–8.3)
TURBIDITY: CLEAR
URINE HGB: NEGATIVE
UROBILINOGEN, URINE: NORMAL
WBC # BLD: 5.3 K/UL (ref 3.5–11.3)
WBC UA: ABNORMAL /HPF (ref 0–5)

## 2022-07-05 PROCEDURE — 2500000003 HC RX 250 WO HCPCS: Performed by: INTERNAL MEDICINE

## 2022-07-05 PROCEDURE — 6370000000 HC RX 637 (ALT 250 FOR IP)

## 2022-07-05 PROCEDURE — 93010 ELECTROCARDIOGRAM REPORT: CPT | Performed by: INTERNAL MEDICINE

## 2022-07-05 PROCEDURE — 2580000003 HC RX 258: Performed by: INTERNAL MEDICINE

## 2022-07-05 PROCEDURE — 97162 PT EVAL MOD COMPLEX 30 MIN: CPT

## 2022-07-05 PROCEDURE — 76770 US EXAM ABDO BACK WALL COMP: CPT

## 2022-07-05 PROCEDURE — 80076 HEPATIC FUNCTION PANEL: CPT

## 2022-07-05 PROCEDURE — 6360000002 HC RX W HCPCS

## 2022-07-05 PROCEDURE — 36415 COLL VENOUS BLD VENIPUNCTURE: CPT

## 2022-07-05 PROCEDURE — 81001 URINALYSIS AUTO W/SCOPE: CPT

## 2022-07-05 PROCEDURE — 99233 SBSQ HOSP IP/OBS HIGH 50: CPT | Performed by: INTERNAL MEDICINE

## 2022-07-05 PROCEDURE — 85025 COMPLETE CBC W/AUTO DIFF WBC: CPT

## 2022-07-05 PROCEDURE — 82550 ASSAY OF CK (CPK): CPT

## 2022-07-05 PROCEDURE — 97530 THERAPEUTIC ACTIVITIES: CPT

## 2022-07-05 PROCEDURE — 83874 ASSAY OF MYOGLOBIN: CPT

## 2022-07-05 PROCEDURE — 99232 SBSQ HOSP IP/OBS MODERATE 35: CPT | Performed by: INTERNAL MEDICINE

## 2022-07-05 PROCEDURE — 80048 BASIC METABOLIC PNL TOTAL CA: CPT

## 2022-07-05 RX ORDER — PANTOPRAZOLE SODIUM 40 MG/1
40 TABLET, DELAYED RELEASE ORAL
Status: DISCONTINUED | OUTPATIENT
Start: 2022-07-05 | End: 2022-07-05 | Stop reason: HOSPADM

## 2022-07-05 RX ORDER — ESCITALOPRAM OXALATE 10 MG/1
10 TABLET ORAL DAILY
Qty: 30 TABLET | Refills: 0 | Status: SHIPPED | OUTPATIENT
Start: 2022-07-05

## 2022-07-05 RX ORDER — PHENYTOIN 50 MG/1
100 TABLET, CHEWABLE ORAL 3 TIMES DAILY
Qty: 180 TABLET | Refills: 0 | Status: SHIPPED | OUTPATIENT
Start: 2022-07-05

## 2022-07-05 RX ORDER — TRAZODONE HYDROCHLORIDE 50 MG/1
50 TABLET ORAL NIGHTLY PRN
Qty: 30 TABLET | Refills: 0 | Status: SHIPPED | OUTPATIENT
Start: 2022-07-05

## 2022-07-05 RX ADMIN — PANTOPRAZOLE SODIUM 40 MG: 40 TABLET, DELAYED RELEASE ORAL at 08:57

## 2022-07-05 RX ADMIN — HEPARIN SODIUM 5000 UNITS: 5000 INJECTION INTRAVENOUS; SUBCUTANEOUS at 05:42

## 2022-07-05 RX ADMIN — PHENYTOIN 100 MG: 50 TABLET, CHEWABLE ORAL at 08:52

## 2022-07-05 RX ADMIN — PHENYTOIN 100 MG: 50 TABLET, CHEWABLE ORAL at 14:38

## 2022-07-05 RX ADMIN — SODIUM BICARBONATE: 84 INJECTION, SOLUTION INTRAVENOUS at 05:34

## 2022-07-05 RX ADMIN — ESCITALOPRAM OXALATE 10 MG: 10 TABLET ORAL at 08:52

## 2022-07-05 RX ADMIN — HEPARIN SODIUM 5000 UNITS: 5000 INJECTION INTRAVENOUS; SUBCUTANEOUS at 14:39

## 2022-07-05 NOTE — PROGRESS NOTES
Nephrology Progress Note      SUBJECTIVE      Patient was seen and examined. No acute events overnight   Pt resting comfortably in bed this morning   No dysuria/hematuria/frequency   Good U/O  BMP results from today showed creatinine improved to 1.04 mg/dl, sodium 143, potassium 4.1, chloride 103, bicarb 29, calcium 8.6. Patient is currently on IV fluids half-normal saline with 75 mg of sodium bicarbonate at 150 cc an hour. Urine output documented as about 1.5 L and x1 more in the last 24 hours. Urine tox was positive for cocaine. Afebrile   VSS    Renal ultrasound 2022: Right kidney 10.9 cm left kidney 10.6 admitted. Impression: Unremarkable renal ultrasound. 2022: Complement levels okay, K/L 1.30. UA 2022: Trace protein, RBC none and WBC 0-2.   OBJECTIVE      CURRENT TEMPERATURE:  Temp: 98.3 °F (36.8 °C)  MAXIMUM TEMPERATURE OVER 24HRS:  Temp (24hrs), Av.4 °F (36.9 °C), Min:98 °F (36.7 °C), Max:98.7 °F (37.1 °C)    CURRENT RESPIRATORY RATE:  Resp: 18  CURRENT PULSE:  Heart Rate: 77  CURRENT BLOOD PRESSURE:  BP: 120/82  24HR BLOOD PRESSURE RANGE:  Systolic (02BCP), SAY:344 , Min:111 , PFD:535   ; Diastolic (70VNH), NXM:60, Min:66, Max:95    24HR INTAKE/OUTPUT:    Intake/Output Summary (Last 24 hours) at 2022 3930  Last data filed at 2022 0601  Gross per 24 hour   Intake 3472 ml   Output 1503 ml   Net 1969 ml       PHYSICAL EXAM      GENERAL APPEARANCE:Awake, alert, in no acute distress  SKIN: warm and dry, no rash or erythema  EYES: conjunctivae normal and sclera anicteric  ENT: no thrush no pharyngeal congestion   NECK:  JVD: None   PULMONARY: clear to auscultation bilaterally- no wheezes, rales or rhonchi, normal air movement, no respiratory distress  CADRDIOVASCULAR: normal rate, normal S1 and S2, no gallops, intact distal pulses and no carotid bruits  ABDOMEN: Non-tender, tense but that is normal for pt per pt,non-distended, bowel sounds normal, no masses, no organomegaly and large ABD incisional scar  EXTREMITIES: no cyanosis, clubbing or edema    CURRENT MEDICATIONS      pantoprazole (PROTONIX) tablet 40 mg, QAM AC  sodium chloride flush 0.9 % injection 5-40 mL, 2 times per day  sodium chloride flush 0.9 % injection 5-40 mL, PRN  0.9 % sodium chloride infusion, PRN  ondansetron (ZOFRAN-ODT) disintegrating tablet 4 mg, Q8H PRN   Or  ondansetron (ZOFRAN) injection 4 mg, Q6H PRN  polyethylene glycol (GLYCOLAX) packet 17 g, Daily PRN  acetaminophen (TYLENOL) tablet 650 mg, Q6H PRN   Or  acetaminophen (TYLENOL) suppository 650 mg, Q6H PRN  heparin (porcine) injection 5,000 Units, 3 times per day  sodium bicarbonate 75 mEq in sodium chloride 0.45 % 1,000 mL infusion, Continuous  escitalopram (LEXAPRO) tablet 10 mg, Daily  phenytoin (DILANTIN) chewable tablet 100 mg, TID  traZODone (DESYREL) tablet 50 mg, Nightly PRN          LABS      CBC:   Recent Labs     07/04/22  0924   WBC 11.5*   RBC 6.66*   HGB 15.9   HCT 52.4*   MCV 78.7*   MCH 23.9*   MCHC 30.3   RDW 16.9*      MPV 9.7      BMP:   Recent Labs     07/04/22  0924 07/04/22  1649 07/04/22  2241    141 139   K 5.5* 4.4 4.1   CL 96* 104 102   CO2 18* 20 23   BUN 64* 55* 40*   CREATININE 4.04* 2.18* 1.59*   GLUCOSE 129* 148* 139*   CALCIUM 9.8 8.5* 8.4*      MAGNESIUM:   Recent Labs     07/04/22  0924   MG 2.8*     ALBUMIN:   Recent Labs     07/04/22 0924   LABALBU 5.5*       SPEP:   Lab Results   Component Value Date/Time    PROT 9.7 07/04/2022 09:24 AM      HEPBSAG:  Lab Results   Component Value Date/Time    HEPBSAG NONREACTIVE 11/15/2021 03:58 AM     HEPCAB:  Lab Results   Component Value Date/Time    HEPCAB NONREACTIVE 11/20/2021 07:46 AM     C3:   Lab Results   Component Value Date    C3 212 (H) 07/04/2022     C4:   Lab Results   Component Value Date    C4 38 07/04/2022     URINE SODIUM:    Lab Results   Component Value Date/Time    BUD 58 07/04/2022 02:16 PM      URINE CREATININE:    Lab Results   Component Value Date/Time    LABCREA 231.5 07/04/2022 02:16 PM     URINALYSIS:  U/A:   Lab Results   Component Value Date/Time    NITRU NEGATIVE 07/04/2022 02:16 PM    COLORU Yellow 07/04/2022 02:16 PM    PHUR 5.0 07/04/2022 02:16 PM    WBCUA 2 TO 5 07/04/2022 02:16 PM    RBCUA 5 TO 10 07/04/2022 02:16 PM    MUCUS NOT REPORTED 11/15/2021 12:06 PM    TRICHOMONAS NOT REPORTED 11/15/2021 12:06 PM    YEAST NOT REPORTED 11/15/2021 12:06 PM    BACTERIA FEW 07/04/2022 02:16 PM    SPECGRAV 1.025 07/04/2022 02:16 PM    LEUKOCYTESUR NEGATIVE 07/04/2022 02:16 PM    UROBILINOGEN Normal 07/04/2022 02:16 PM    BILIRUBINUR NEGATIVE 07/04/2022 02:16 PM    GLUCOSEU NEGATIVE 07/04/2022 02:16 PM    KETUA SMALL 07/04/2022 02:16 PM    AMORPHOUS NOT REPORTED 11/15/2021 12:06 PM     RADIOLOGY      Reviewed as available. ASSESSMENT    1. SYLVIA non-oliguric suspect pre-renal related to GI losses, ATN from crack cocaine use, and commitant chronic NSAID use, baseline Cr appears normal.   Now creatinine improved with fluid resuscitation. 2. Crack cocaine use  3. HYPERTENSION    4. Hx seizures  5. Hx alcohol abuse, reported sober 6 months, recent relapse  6. Psychiatric hx as well. 7. Metabolic acidosis  8. Hyperkalemia secondary to above. PLAN    1. Stop bicarb drip. 2.  Encourage better oral intake. 3.  Okay to discharge from nephrology standpoint. 4.  Since renal function has improved and urine output okay, nephrology will sign off. Please call with other questions. 5.  Avoid NSAIDs. Please do not hesitate to call with questions. Electronically signed by Ronnie Sandifer, MD on 7/5/2022 at 8:24 AM     Attending Physician Statement  I have discussed the care of this patient, including pertinent history and exam findings, with the Resident/CNP. I have reviewed and edited the key elements of all parts of the encounter with the Resident/CNP. I agree with the assessment, plan and orders as documented by the Resident/CNP. Wili Ortiz, MD   Nephrology [address] [place] [address] Drive    This note is created with the assistance of a speech-recognition program. While intending to generate a document that actually reflects the content of the visit, no guarantees can be provided that every mistake has been identified and corrected by editing.

## 2022-07-05 NOTE — PROGRESS NOTES
[facility]  Internal Medicine Teaching Residency Program  Inpatient Daily Progress Note  ______________________________________________________________________________    Patient: Heydi Roberts  YOB: 1965   TPX:[id number]    Acct: [de-identified]     Room: [address] [place] [address]  Admit date: 7/4/2022  Today's date: 07/05/22  Number of days in the hospital: 1    SUBJECTIVE   Admitting Diagnosis: Acute kidney injury ([facility] [address]  CC: Vomiting and Abdominal Pain    Pt examined at bedside. No acute events overnight, VSS  Chart & results reviewed. Denies any pyuria, dysuria, urinary frequency or urgency. ROS:  Constitutional:  negative for chills, fevers, sweats  Respiratory:  negative for cough, dyspnea on exertion, hemoptysis, shortness of breath, wheezing  Cardiovascular:  negative for chest pain, chest pressure/discomfort, lower extremity edema, palpitations  Gastrointestinal:  negative for abdominal pain, constipation, diarrhea, nausea, vomiting  Neurological:  negative for dizziness, headache  BRIEF HISTORY     The patient is a pleasant 49 y.o. male with past medical history of seizures, HTN, DM, MI x2, multiple traumas s/p splenectomy. presents  to the ED with a chief complaint of abdominal pain with vomiting. The patient stated that yesterday he had taken some crack cocaine and then he started having abdominal pain and profuse vomiting, yellow and watery with no food contents for first time. He also states that later he then took 2 sips of a beer and had more vomiting, but this time there was some dark red blood that he noticed and he came to the ED. Before yesterday, the patient stated that he was sober from crack cocaine and alcohol for about 6 months. The patient states that the abdominal pain is migratory and intermittent, he says that it starts in one quadrant and then once that area starts feeling better he would feel it starting in another area.  Patient also reported a history of frequent NSAID use for his chronic right knee pain. OBJECTIVE     Vital Signs:  /66   Pulse 90   Temp 98.5 °F (36.9 °C)   Resp 12   Ht 5' 10\" (1.778 m)   Wt 182 lb 15.7 oz (83 kg)   SpO2 93%   BMI 26.26 kg/m²     Temp (24hrs), Av.4 °F (36.9 °C), Min:98 °F (36.7 °C), Max:98.7 °F (37.1 °C)    In:    Out: 1103 [Urine:1103]    Physical Exam:  Constitutional: This is a well developed, well nourished, 25-29.9 - Overweight 49y.o. year old male who is alert, oriented, cooperative and in no apparent distress. Head:normocephalic and atraumatic. EENT:  PERRLA. No conjunctival injections. Septum was midline, mucosa was without erythema, exudates or cobblestoning. No thrush was noted. Neck: Supple without thyromegaly. No elevated JVP. Trachea was midline. Respiratory: Chest was symmetrical without dullness to percussion. Breath sounds bilaterally were clear to auscultation. There were no wheezes, rhonchi or rales. There is no intercostal retraction or use of accessory muscles. No egophony noted. Cardiovascular: Regular without murmur, clicks, gallops or rubs. Abdomen: Slightly rounded and soft without organomegaly. No rebound, rigidity or guarding was appreciated. Lymphatic: No lymphadenopathy. Musculoskeletal: Normal curvature of the spine. No gross muscle weakness. Extremities:  No lower extremity edema, ulcerations, tenderness, varicosities or erythema. Muscle size, tone and strength are normal.  No involuntary movements are noted. Skin:  Warm and dry. Good color, turgor and pigmentation. No lesions or scars.   No cyanosis or clubbing  Neurological/Psychiatric: The patient's general behavior, level of consciousness, thought content and emotional status is normal.        Medications:  Scheduled Medications:    pantoprazole (PROTONIX) 40 mg injection  40 mg IntraVENous Daily    sodium chloride flush  5-40 mL IntraVENous 2 times per day    heparin improved  Suggestive of Prerenal  On IV bicarb fluids. Will rule out postrenal aspect by kidney ultrasound, planned for today  Renal Diet  Monitor I&Os       Metabolic Acidosis  On IV bicarb  Monitor CMP       Hyperkalemia  Improved, K+ 4.1  Likely due to metabolic acidosis  Monitor potassium       History of seizures  Restart home dose of dilantin       Cocaine use  Toxicology Cocaine+  Abstinence discussed       Nausea/Vomiting  Symptom management     DVT ppx: Heparin  GI ppx: Protonix  PT/OT/SW: consulted  Discharge Planning: CM to assist with discharge planning    Jim Beard MD  Internal Medicine Resident, PGY-1  [address] [place], [place]  7/5/2022, 5:10 AM    Attestation and add on       I have discussed the care of Juancarlos Millan , including pertinent history and exam findings,      7/5/22    with the resident. I have seen and examined the patient and the key elements of all parts of the encounter have been performed by me . I agree with the assessment, plan and orders as documented by the resident. Hospital Problems           Last Modified POA    * (Principal) Acute kidney injury ([address] 7/4/2022 Yes    Hyperkalemia 4/4/9737 Yes    Metabolic acidosis 7/7/5240 Yes    Non-compliance 7/4/2022 Yes    HTN (hypertension) 7/4/2022 Yes    Alcohol abuse 7/4/2022 Yes    History of seizures-takes Dilantin 7/4/2022 Yes    Cocaine use 7/4/2022 Yes             ''''''chilo improving''''    BMP: Recent Labs     07/04/22  2241 07/05/22  0930    143   K 4.1 4.1   CO2 23 29   BUN 40* 23*   CREATININE 1.59* 1.04   LABGLOM 46* >60   GLUCOSE 139* 1900 MD MARCIA Olson [address], [address] [place] [address].    Phone [phone]   Fax: [phone]  Answering Service: [phone]

## 2022-07-05 NOTE — PROGRESS NOTES
Physical Therapy  Facility/Department: [address] STEPDOWN  Physical Therapy Initial Assessment    Name: Karine Mckoy  : 1965  MRN: [id number]  Date of Service: 2022  Chief Complaint   Patient presents with    Abdominal Pain     Discharge Recommendations:  Patient would benefit from continued therapy after discharge      Patient Diagnosis(es): The primary encounter diagnosis was Acute kidney injury [facility]). A diagnosis of Troponin level elevated was also pertinent to this visit. Past Medical History:  has a past medical history of Seizures ([address] Past Surgical History:  has no past surgical history on file. Assessment   Body Structures, Functions, Activity Limitations Requiring Skilled Therapeutic Intervention: Decreased functional mobility ; Decreased strength;Decreased endurance  Assessment: The pt ambulated 30 ft with a RW x CGA. He experienced  increased difficulty with his transfers. He also reported difficulty using his R LE. He could benefit from a continuation of PT to address his deficits  Therapy Prognosis: Good  Decision Making: Medium Complexity  Requires PT Follow-Up: Yes  Activity Tolerance  Activity Tolerance: Patient limited by fatigue;Patient limited by endurance     Plan   Plan  Plan:  (5-6x wk)  Current Treatment Recommendations: Strengthening,Functional mobility training,Endurance training,Gait training,Stair training,Safety education & training  Safety Devices  Type of Devices: Nurse notified,Left in bed,Call light within reach     Restrictions  Restrictions/Precautions  Restrictions/Precautions: Up as Tolerated  Position Activity Restriction  Other position/activity restrictions:  Up with assist     Subjective   General  Patient assessed for rehabilitation services?: Yes  Response To Previous Treatment: Not applicable  Family / Caregiver Present: No  Follows Commands: Within Functional Limits  Subjective  Subjective: Pt denies pain     Social/Functional History  Social/Functional History  Lives With: Significant other  Type of Home: House  Home Layout: Two level,Performs ADL's on one level  Home Access: Stairs to enter with rails  Entrance Stairs - Number of Steps: 4  Entrance Stairs - Rails: Right  Bathroom Shower/Tub: Tub/Shower unit  Bathroom Toilet: Standard  Home Equipment: Gaetano Kyle  ADL Assistance: Independent  Homemaking Assistance: Independent  Ambulation Assistance: Independent  Transfer Assistance: Independent  Active : No  Occupation: Unemployed  Additional Comments:  The pt states that his R  knee sometimes locks up  when he's walking  Vision/Hearing  Vision  Vision: Within Functional Limits  Hearing  Hearing: Within functional limits    Cognition   Orientation  Overall Orientation Status: Within Functional Limits  Cognition  Overall Cognitive Status: WFL     Objective   Heart Rate: 77  Heart Rate Source: Monitor  BP: 120/82  BP Location: Left Arm  BP Method: Automatic  Patient Position: Semi fowlers  MAP (Calculated): 94.67  Resp: 18  SpO2: 94 %  O2 Device: None (Room air)     AROM RLE (degrees)  RLE AROM: WFL  AROM LLE (degrees)  LLE AROM : WNL  AROM RUE (degrees)  RUE AROM : WNL  AROM LUE (degrees)  LUE AROM : WNL  Strength RLE  Strength RLE: WFL  Strength LLE  Strength LLE: WNL  Strength RUE  Strength RUE: WNL  Strength LUE  Strength LUE: WNL        Bed mobility  Rolling to Right: Minimal assistance  Supine to Sit: Minimal assistance  Sit to Supine: Minimal assistance  Transfers  Sit to Stand: Minimal Assistance  Stand to sit: Minimal Assistance  Ambulation  Surface: level tile  Device: Rolling Walker  Assistance: Contact guard assistance  Gait Deviations: Slow Kavita;Decreased step length  Distance: amb 30 ft wit ha RW x CGA     Balance  Posture: Good  Sitting - Static: Good  Sitting - Dynamic: Fair  Standing - Static: Fair  Standing - Dynamic: Fair  OutComes Score   AM-PAC Score  AM-PAC Inpatient Mobility Raw Score : 17 (07/05/22 1122)  AM-PAC Inpatient T-Scale Score : 42.13 (07/05/22 1122)  Mobility Inpatient CMS 0-100% Score: 50.57 (07/05/22 1122)  Mobility Inpatient CMS G-Code Modifier : CK (07/05/22 1122)     Goals  Short Term Goals  Time Frame for Short term goals: 10 visits  Short term goal 1: transfers with SBA  Short term goal 2: amb 150 ft with a RW x SBA  Short term goal 3: ascend/descend 4 steps with SBA  Short term goal 4: 20 min exercise program x SBA  Patient Goals   Patient goals : Return home     Education  Patient Education  Education Given To: Patient  Education Provided: Role of Therapy;Plan of Care  Education Method: Verbal  Barriers to Learning: None  Education Outcome: Verbalized understanding    Therapy Time   Individual Concurrent Group Co-treatment   Time In 0920         Time Out 0945         Minutes 25             1 of [facility],

## 2022-07-07 NOTE — DISCHARGE SUMMARY
[address]     Department of Internal Medicine - Staff Internal Medicine Teaching Service    INPATIENT DISCHARGE SUMMARY      Patient Identification:  Wilber Brandt is a 49 y.o. male. :  1965  MRN: [id number]     Acct: [de-identified]   PCP: No primary care provider on file. Admit Date:  2022  Discharge date and time: 2022  6:00 PM   Attending Provider: No att. providers found                                     [address] [facility] Problem Lists:  Principal Problem:    Acute kidney injury ([address]  Active Problems:    Hyperkalemia    Metabolic acidosis    Non-compliance    Hypertension, essential    Alcohol abuse    History of seizures-takes Dilantin    Cocaine use  Resolved Problems:    * No resolved hospital problems. *      HOSPITAL STAY     Brief Inpatient course:   Wilber Brandt is a 49 y.o. male who was admitted for the management of Acute kidney injury [facility]), presented to the emergency department with vomiting and abdominal pain. On initial labs, the patient was found to have an acute kidney injury and metabolic acidosis and admitted for treatment. In the hospital he was placed on IV bicarb fluids. His SYLVIA and acidosis improved and an ultrasound ruled out any obstructive pathology and the patient was discharged.     Procedures/ Significant Interventions:    Ultrasound of the kidneys    Consults:     Consults:     Final Specialist Recommendations/Findings:   IP CONSULT TO INTERNAL MEDICINE  IP CONSULT TO NEPHROLOGY  IP CONSULT TO CASE MANAGEMENT      Any Hospital Acquired Infections: none    Discharge Functional Status:  stable    DISCHARGE PLAN     Disposition: home    Patient Instructions:   Discharge Medication List as of 2022  5:38 PM      CONTINUE these medications which have CHANGED    Details   phenytoin (DILANTIN) 50 MG tablet Take 2 tablets by mouth 3 times daily, Disp-180 tablet, R-0Normal      escitalopram (LEXAPRO) 10 MG tablet Take 1 tablet by mouth daily, Disp-30 tablet, R-0Normal      traZODone (DESYREL) 50 MG tablet Take 1 tablet by mouth nightly as needed for Sleep, Disp-30 tablet, R-0Normal             Activity: activity as tolerated    Diet: regular diet    Follow-up:    Luciano Bell MD  [address] [address]  [phone]    Schedule an appointment as soon as possible for a visit in 1 week  hospital f/u for SYLVIA      Patient Instructions:   · You were admitted due to acute kidney injury due to cocaine abuse/vomiting. · Please hydrate yourself well  · Do not use cocaine  · If you start experiencing low urine out put, please return to ER    Follow up labs: None  Follow up imaging: None    Note that over 30 minutes was spent in preparing discharge papers, discussing discharge with patient, medication review, etc.      Timmie Bumpers, MD, MD  Internal Medicine Resident, PGY-1  [address] [place] [address];  [place], [place]  7/7/2022, 4:24 PM

## 2022-07-08 ENCOUNTER — HOSPITAL ENCOUNTER (EMERGENCY)
Age: 57
Discharge: HOME | End: 2022-07-08
Attending: EMERGENCY MEDICINE
Payer: MEDICARE

## 2022-07-08 VITALS
TEMPERATURE: 97.5 F | SYSTOLIC BLOOD PRESSURE: 144 MMHG | WEIGHT: 205 LBS | HEART RATE: 78 BPM | DIASTOLIC BLOOD PRESSURE: 92 MMHG | OXYGEN SATURATION: 97 % | BODY MASS INDEX: 29.35 KG/M2 | RESPIRATION RATE: 16 BRPM | HEIGHT: 70 IN

## 2022-07-08 DIAGNOSIS — G89.29 CHRONIC PAIN OF RIGHT KNEE: Primary | ICD-10-CM

## 2022-07-08 DIAGNOSIS — M25.561 CHRONIC PAIN OF RIGHT KNEE: Primary | ICD-10-CM

## 2022-07-08 PROCEDURE — 99283 EMERGENCY DEPT VISIT LOW MDM: CPT

## 2022-07-08 PROCEDURE — 6370000000 HC RX 637 (ALT 250 FOR IP): Performed by: STUDENT IN AN ORGANIZED HEALTH CARE EDUCATION/TRAINING PROGRAM

## 2022-07-08 RX ORDER — OXYCODONE HYDROCHLORIDE AND ACETAMINOPHEN 5; 325 MG/1; MG/1
1 TABLET ORAL EVERY 8 HOURS PRN
Qty: 5 TABLET | Refills: 0 | Status: SHIPPED | OUTPATIENT
Start: 2022-07-08 | End: 2022-07-11

## 2022-07-08 RX ORDER — OXYCODONE HYDROCHLORIDE AND ACETAMINOPHEN 5; 325 MG/1; MG/1
1 TABLET ORAL ONCE
Status: COMPLETED | OUTPATIENT
Start: 2022-07-08 | End: 2022-07-08

## 2022-07-08 RX ADMIN — OXYCODONE HYDROCHLORIDE AND ACETAMINOPHEN 1 TABLET: 5; 325 TABLET ORAL at 09:12

## 2022-07-08 ASSESSMENT — ENCOUNTER SYMPTOMS
RHINORRHEA: 0
SHORTNESS OF BREATH: 0
VOMITING: 0
CHEST TIGHTNESS: 0
SORE THROAT: 0
DIARRHEA: 0
COUGH: 0
ABDOMINAL PAIN: 0
NAUSEA: 0
CONSTIPATION: 0
COLOR CHANGE: 0
EYE PAIN: 0
SINUS PRESSURE: 0
PHOTOPHOBIA: 0
EYE REDNESS: 0

## 2022-07-08 ASSESSMENT — PAIN DESCRIPTION - LOCATION
LOCATION: KNEE
LOCATION: LEG

## 2022-07-08 ASSESSMENT — PAIN DESCRIPTION - ORIENTATION: ORIENTATION: RIGHT

## 2022-07-08 ASSESSMENT — PAIN SCALES - GENERAL
PAINLEVEL_OUTOF10: 10
PAINLEVEL_OUTOF10: 10

## 2022-07-08 ASSESSMENT — PAIN - FUNCTIONAL ASSESSMENT: PAIN_FUNCTIONAL_ASSESSMENT: 0-10

## 2022-07-08 NOTE — ED PROVIDER NOTES
[facility]  ED  Emergency Department Encounter  EmergencyMedicine Resident     Pt Name:Carlos Dugan  MRN: [id number]  Cedricgfrosalia 1965  Date of evaluation: 7/8/22  PCP:  No primary care provider on file. This patient was evaluated in the Emergency Department for symptoms described in the history of present illness. The patient was evaluated in the context of the global COVID-19 pandemic, which necessitated consideration that the patient might be at risk for infection with the SARS-CoV-2 virus that causes COVID-19. Institutional protocols and algorithms that pertain to the evaluation of patients at risk for COVID-19 are in a state of rapid change based on information released by regulatory bodies including the CDC and federal and state organizations. These policies and algorithms were followed during the patient's care in the ED. CHIEF COMPLAINT       Chief Complaint   Patient presents with    Leg Pain     R leg pain, \"had an accident in 1997 and I haven't been right since\"        HISTORY OF PRESENT ILLNESS  (Location/Symptom, Timing/Onset, Context/Setting, Quality, Duration, Modifying Factors, Severity.)      Yanique Rich is a 49 y.o. male who presents with right knee pain this been ongoing since 1997. He states over the past several weeks its been worsening as he been having difficulty ambulating. He has been seen by an orthopedic surgeon at [facility] and has establish care. States that he required surgery. He is able to ambulate to the ED with a limp. Right knee does appear deformed, however he states that this. Like this since the past couple years. Popliteal pulses intact, DPs intact. No loss of sensation distally. No swelling or erythema, low concern for septic joint. No recent falls or trauma. PAST MEDICAL / SURGICAL / SOCIAL / FAMILY HISTORY      has a past medical history of Seizures ([facility] [address] has no past surgical history on file.       Social History     Socioeconomic History    Marital status: Single     Spouse name: Not on file    Number of children: Not on file    Years of education: Not on file    Highest education level: Not on file   Occupational History    Not on file   Tobacco Use    Smoking status: Never Smoker    Smokeless tobacco: Never Used   Substance and Sexual Activity    Alcohol use: No    Drug use: Not Currently    Sexual activity: Not on file   Other Topics Concern    Not on file   Social History Narrative    Not on file     Social Determinants of Health     Financial Resource Strain:     Difficulty of Paying Living Expenses: Not on file   Food Insecurity:     Worried About Running Out of Food in the Last Year: Not on file    Ervin of Food in the Last Year: Not on file   Transportation Needs:     Lack of Transportation (Medical): Not on file    Lack of Transportation (Non-Medical): Not on file   Physical Activity:     Days of Exercise per Week: Not on file    Minutes of Exercise per Session: Not on file   Stress:     Feeling of Stress : Not on file   Social Connections:     Frequency of Communication with Friends and Family: Not on file    Frequency of Social Gatherings with Friends and Family: Not on file    Attends Sikhism Services: Not on file    Active Member of [employer] or Organizations: Not on file    Attends Club or Organization Meetings: Not on file    Marital Status: Not on file   Intimate Partner Violence:     Fear of Current or Ex-Partner: Not on file    Emotionally Abused: Not on file    Physically Abused: Not on file    Sexually Abused: Not on file   Housing Stability:     Unable to Pay for Housing in the Last Year: Not on file    Number of [place] in the Last Year: Not on file    Unstable Housing in the Last Year: Not on file       No family history on file. Allergies:  Pcn [penicillins]    Home Medications:  Prior to Admission medications    Medication Sig Start Date End Date Taking?  Authorizing Provider oxyCODONE-acetaminophen (PERCOCET) 5-325 MG per tablet Take 1 tablet by mouth every 8 hours as needed for Pain for up to 3 days. Intended supply: 3 days. Take lowest dose possible to manage pain 7/8/22 7/11/22 Yes Madeline Purvis MD   phenytoin (DILANTIN) 50 MG tablet Take 2 tablets by mouth 3 times daily 7/5/22   Jo Raymundo MD   escitalopram (LEXAPRO) 10 MG tablet Take 1 tablet by mouth daily 7/5/22   Jo Raymundo MD   traZODone (DESYREL) 50 MG tablet Take 1 tablet by mouth nightly as needed for Sleep 7/5/22   Jo Raymundo MD       REVIEW OF SYSTEMS    (2-9 systems for level 4, 10 or more for level 5)      Review of Systems   Constitutional: Negative for activity change, chills, diaphoresis, fatigue and fever. HENT: Negative for congestion, rhinorrhea, sinus pressure and sore throat. Eyes: Negative for photophobia, pain and redness. Respiratory: Negative for cough, chest tightness and shortness of breath. Cardiovascular: Negative for chest pain and leg swelling. Gastrointestinal: Negative for abdominal pain, constipation, diarrhea, nausea and vomiting. Genitourinary: Negative for dysuria, flank pain, frequency and urgency. Musculoskeletal: Positive for arthralgias and gait problem. Negative for myalgias and neck stiffness. Skin: Negative for color change, pallor and rash. Neurological: Negative for dizziness, syncope, weakness, light-headedness, numbness and headaches. PHYSICAL EXAM   (up to 7 for level 4, 8 or more for level 5)      INITIAL VITALS:   BP (!) 144/92   Pulse 78   Temp 97.5 °F (36.4 °C) (Oral)   Resp 16   Ht 5' 10\" (1.778 m)   Wt 205 lb (93 kg)   SpO2 97%   BMI 29.41 kg/m²     Physical Exam  Constitutional:  Well developed, Well nourished. HENT:  Normocephalic, Atraumatic, Bilateral external ears normal,  Nose normal.   Neck: Normal range of motion, No stridor. Eyes:   No discharge.    Respiratory:   No respiratory distress, Normal breath sounds without any wheezing, rales or rhonchi. Cardiovascular: Normal S1, S2. No rubs, gallops or murmurs. Gastrointestinal:  No organomegaly, no tenderness, no rebound or guarding. Musculoskeletal: Chronic right knee deformity, no swelling, no redness, pain on active and passive range of motion. Lymphatic: No lymphadenopathy noted  Skin:  Warm, Dry,  No rash. Neurologic:  Alert & oriented x 3, Normal motor function,  No focal deficits noted. Psychiatric:  Affect normal, Judgment normal, Mood normal.              DIFFERENTIAL  DIAGNOSIS     PLAN (LABS / IMAGING / EKG):  Orders Placed This Encounter   Procedures    Apply ace wrap    [facility] Crutches; Pair, Right Side Injury; Tall (5'10\"-6'6\")       MEDICATIONS ORDERED:  Orders Placed This Encounter   Medications    oxyCODONE-acetaminophen (PERCOCET) 5-325 MG per tablet 1 tablet    oxyCODONE-acetaminophen (PERCOCET) 5-325 MG per tablet     Sig: Take 1 tablet by mouth every 8 hours as needed for Pain for up to 3 days. Intended supply: 3 days. Take lowest dose possible to manage pain     Dispense:  5 tablet     Refill:  0       DDX: Arthritis, acute on chronic knee pain, septic joint, VTE    DIAGNOSTIC RESULTS / EMERGENCY DEPARTMENT COURSE / MDM   LAB RESULTS:  No results found for this visit on 07/08/22. RADIOLOGY:  No orders to display            IMPRESSION: 56-year-old male with acute on chronic right knee pain from an injury from 1997. Is being seen by orthopedic surgeon at [facility] for this issue. No signs of septic joint, low concern for as there is no swelling, no history of blood clots, no pallor, pulses intact distally and popliteal intact. Symptomatic management with crutches, Ace wrap, couple days with the Percocet and advisement to follow-up with orthopedic surgeon.     EMERGENCY DEPARTMENT COURSE:  ED Course as of 07/08/22 1126   Fri Jul 08, 2022   1125 Patient instructed by nurse on how to use crutches, right knee wrapped in Ace wrap. Is observed to ambulate from the ED on crutches without any issues. Patient understands the need to follow-up with orthopedic surgeon at [address]. [QC]      ED Course User Index  [QC] Lachelle Toscano MD               PROCEDURES:      CONSULTS:  None        FINAL IMPRESSION      1. Chronic pain of right knee          DISPOSITION / PLAN     DISPOSITION Decision To Discharge 07/08/2022 08:42:15 AM      PATIENT REFERRED TO:  [facility] ED  [address] [place] [address]  [phone]    If symptoms worsen      DISCHARGE MEDICATIONS:  Discharge Medication List as of 7/8/2022  9:23 AM      START taking these medications    Details   oxyCODONE-acetaminophen (PERCOCET) 5-325 MG per tablet Take 1 tablet by mouth every 8 hours as needed for Pain for up to 3 days. Intended supply: 3 days.  Take lowest dose possible to manage pain, Disp-5 tablet, R-0Print             Lachelle Toscano MD  Emergency Medicine Resident PGY2    (Please note that portions of thisnote were completed with a voice recognition program.  Efforts were made to edit the dictations but occasionally words are mis-transcribed.)        Lachelle Toscano MD  Resident  07/08/22 2806

## 2022-08-12 NOTE — ED NOTES
This patient was assessed by the doctor only. Nurse processed and completed the orders from this doctor ie labs, meds, and/or EKG.           Sara Doss RN  07/08/22 0215 4 = No assist / stand by assistance

## 2025-06-05 ENCOUNTER — APPOINTMENT (OUTPATIENT)
Dept: CT IMAGING | Age: 60
End: 2025-06-05
Payer: COMMERCIAL

## 2025-06-05 ENCOUNTER — HOSPITAL ENCOUNTER (EMERGENCY)
Age: 60
Discharge: HOME | End: 2025-06-05
Attending: EMERGENCY MEDICINE
Payer: COMMERCIAL

## 2025-06-05 ENCOUNTER — APPOINTMENT (OUTPATIENT)
Dept: GENERAL RADIOLOGY | Age: 60
End: 2025-06-05
Payer: COMMERCIAL

## 2025-06-05 VITALS
SYSTOLIC BLOOD PRESSURE: 119 MMHG | RESPIRATION RATE: 16 BRPM | OXYGEN SATURATION: 97 % | TEMPERATURE: 98.1 F | HEART RATE: 81 BPM | DIASTOLIC BLOOD PRESSURE: 87 MMHG

## 2025-06-05 DIAGNOSIS — M25.561 ACUTE PAIN OF RIGHT KNEE: Primary | ICD-10-CM

## 2025-06-05 PROCEDURE — 73700 CT LOWER EXTREMITY W/O DYE: CPT

## 2025-06-05 PROCEDURE — 6370000000 HC RX 637 (ALT 250 FOR IP): Performed by: STUDENT IN AN ORGANIZED HEALTH CARE EDUCATION/TRAINING PROGRAM

## 2025-06-05 PROCEDURE — 99284 EMERGENCY DEPT VISIT MOD MDM: CPT

## 2025-06-05 PROCEDURE — 73562 X-RAY EXAM OF KNEE 3: CPT

## 2025-06-05 RX ORDER — LIDOCAINE 4 G/G
1 PATCH TOPICAL DAILY
Qty: 14 PATCH | Refills: 0 | Status: SHIPPED | OUTPATIENT
Start: 2025-06-05 | End: 2025-06-19

## 2025-06-05 RX ORDER — NAPROXEN 250 MG/1
250 TABLET ORAL ONCE
Status: COMPLETED | OUTPATIENT
Start: 2025-06-05 | End: 2025-06-05

## 2025-06-05 RX ORDER — ACETAMINOPHEN 500 MG
1000 TABLET ORAL ONCE
Status: COMPLETED | OUTPATIENT
Start: 2025-06-05 | End: 2025-06-05

## 2025-06-05 RX ORDER — ACETAMINOPHEN 500 MG
1000 TABLET ORAL EVERY 8 HOURS PRN
Qty: 30 TABLET | Refills: 0 | Status: SHIPPED | OUTPATIENT
Start: 2025-06-05

## 2025-06-05 RX ORDER — NAPROXEN 250 MG/1
250 TABLET ORAL 2 TIMES DAILY WITH MEALS
Qty: 10 TABLET | Refills: 0 | Status: SHIPPED | OUTPATIENT
Start: 2025-06-05

## 2025-06-05 RX ORDER — LIDOCAINE 4 G/G
1 PATCH TOPICAL ONCE
Status: DISCONTINUED | OUTPATIENT
Start: 2025-06-05 | End: 2025-06-05 | Stop reason: HOSPADM

## 2025-06-05 RX ADMIN — NAPROXEN 250 MG: 250 TABLET ORAL at 08:49

## 2025-06-05 RX ADMIN — ACETAMINOPHEN 1000 MG: 500 TABLET, FILM COATED ORAL at 08:49

## 2025-06-05 ASSESSMENT — PAIN DESCRIPTION - LOCATION: LOCATION: KNEE

## 2025-06-05 ASSESSMENT — PAIN SCALES - GENERAL: PAINLEVEL_OUTOF10: 8

## 2025-06-05 ASSESSMENT — PAIN DESCRIPTION - ORIENTATION: ORIENTATION: RIGHT

## 2025-06-05 NOTE — ED NOTES
Patient presents to ED for acute on chronic right knee pain  States knee pain has been ongoing \"for 30

## 2025-06-05 NOTE — ED PROVIDER NOTES
[facility] EMERGENCY DEPARTMENT  Emergency Department Encounter  Emergency Medicine Resident     Pt Name:Carlos Pal  MRN: [id number]  Birthdate 1965  Date of evaluation: 6/5/25  PCP:  No primary care provider on file.  Note Started: 11:05 AM EDT      CHIEF COMPLAINT       Chief Complaint   Patient presents with    Knee Pain     Right, chronic       HISTORY OF PRESENT ILLNESS  (Location/Symptom, Timing/Onset, Context/Setting, Quality, Duration, Modifying Factors, Severity.)      Carlos Pal is a 59 y.o. male past medical history of chronic right knee pain, seizures order, presenting for assessment of right-sided knee pain.  Symptom onset was earlier this morning.  Patient states he was ambulating normally but then his knee all of a sudden gave out on him and there may have been a twisting motion to the right knee.  He was unable to bear weight thereafter.  He did not fall or hit his head, a friend had caught him.  He has not take any medication for symptoms.  He is denying any distal weakness or paresthesias.    Patient states that he has actually had right-sided knee pain for the last 38 years.  States that he had previously been suggested to have a knee replacement but has not pursued this treatment up until now.   PAST MEDICAL / SURGICAL / SOCIAL / FAMILY HISTORY      has a past medical history of Seizures ([facility]).        has no past surgical history on file.       Social History     Socioeconomic History    Marital status: Single     Spouse name: Not on file    Number of children: Not on file    Years of education: Not on file    Highest education level: Not on file   Occupational History    Not on file   Tobacco Use    Smoking status: Never    Smokeless tobacco: Never   Substance and Sexual Activity    Alcohol use: No    Drug use: Not Currently    Sexual activity: Not on file   Other Topics Concern    Not on file   Social History Narrative    Not on file     Social Drivers of Health  concerning evolution of his symptoms.         PROCEDURES:       CONSULTS:  None    CRITICAL CARE:  There was significant risk of life threatening deterioration of patient's condition requiring my direct management. Critical care time   minutes, excluding any documented procedures.    FINAL IMPRESSION      1. Acute pain of right knee          DISPOSITION / PLAN     DISPOSITION Decision To Discharge 06/05/2025 11:12:56 AM   DISPOSITION CONDITION Stable           PATIENT REFERRED TO:  [facility]  [facility] [address]  [phone]  Schedule an appointment as soon as possible for a visit       [facility] Emergency Department  [address] [place] [address]  [phone]    As needed      DISCHARGE MEDICATIONS:  Discharge Medication List as of 6/5/2025 11:14 AM        START taking these medications    Details   acetaminophen (TYLENOL) 500 MG tablet Take 2 tablets by mouth every 8 hours as needed for Pain, Disp-30 tablet, R-0Print      lidocaine 4 % external patch Place 1 patch onto the skin daily for 14 days, TransDERmal, DAILY Starting Thu 6/5/2025, Until Thu 6/19/2025, For 14 days, Disp-14 patch, R-0, Print      naproxen (NAPROSYN) 250 MG tablet Take 1 tablet by mouth 2 times daily (with meals), Disp-10 tablet, R-0Print             Chandan Moore MD  Emergency Medicine Resident    (Please note that portions of this note were completed with a voice recognition program.  Efforts were made to edit the dictations but occasionally words are mis-transcribed.)

## 2025-06-05 NOTE — ED PROVIDER NOTES
[facility]     Emergency Department     Faculty Attestation  9:51 AM EDT      I performed a history and physical examination of the patient and discussed management with the resident. I have reviewed and agree with the resident’s findings including all diagnostic interpretations, and treatment plans as written. Any areas of disagreement are noted on the chart. I was personally present for the key portions of any procedures. I have documented in the chart those procedures where I was not present during the key portions. I have reviewed the emergency nurses triage note. I agree with the chief complaint, past medical history, past surgical history, allergies, medications, social and family history as documented unless otherwise noted below. Documentation of the HPI, Physical Exam and Medical Decision Making performed by scribbarbara is based on my personal performance of the HPI, PE and MDM. For Physician Assistant/ Nurse Practitioner cases/documentation I have personally evaluated this patient and have completed at least one if not all key elements of the E/M (history, physical exam, and MDM). Additional findings are as noted.    Patient with chronic right knee pain.  States he always has discomfort, and years ago he was told he needed surgery at [facility] but declined.  States today when he was walking he felt something snap and almost fell to the ground but someone behind him caught him and he them leaning him against the wall where he then snapped his knee back in place.  But has significant pain since that time.  That is worse than usual.  He does have on exam some edema noted medially, and significant pain with range of motion he is able to fully extend, and flex to about 90 degrees.  There is no obvious deformity noted.  He has 2+ pedal pulse on the right foot, and capillary refill less than 2 seconds.    Concern for knee dislocation based on mechanism patient with

## 2025-06-05 NOTE — DISCHARGE INSTRUCTIONS
You were seen in the emergency department today due to concern for knee pain.  Your x-ray and CT scans are showing severe osteoarthritis.  You are a candidate for a knee replacement.  You will be referred to the orthopedic surgery service.  Please contact them about an appointment to be seen a soon as possible.  You may bear weight on your leg as tolerated.  You will be provided crutches to use as needed for additional support.    Monitor symptoms closely at home.  If you develop any worsening pain, redness, swelling, fevers, chills, change in color or temperature of your foot, or any other urgent health concerns, return to emergency room immediately for reassessment.